# Patient Record
Sex: MALE | Race: OTHER | Employment: OTHER | URBAN - METROPOLITAN AREA
[De-identification: names, ages, dates, MRNs, and addresses within clinical notes are randomized per-mention and may not be internally consistent; named-entity substitution may affect disease eponyms.]

---

## 2019-12-05 ENCOUNTER — APPOINTMENT (OUTPATIENT)
Dept: GENERAL RADIOLOGY | Facility: HOSPITAL | Age: 74
End: 2019-12-05
Attending: EMERGENCY MEDICINE
Payer: MEDICARE

## 2019-12-05 ENCOUNTER — HOSPITAL ENCOUNTER (EMERGENCY)
Facility: HOSPITAL | Age: 74
Discharge: HOME | End: 2019-12-05
Attending: EMERGENCY MEDICINE
Payer: MEDICARE

## 2019-12-05 ENCOUNTER — APPOINTMENT (OUTPATIENT)
Dept: CT IMAGING | Facility: HOSPITAL | Age: 74
End: 2019-12-05
Attending: EMERGENCY MEDICINE
Payer: MEDICARE

## 2019-12-05 VITALS
RESPIRATION RATE: 18 BRPM | BODY MASS INDEX: 25.9 KG/M2 | HEIGHT: 67 IN | DIASTOLIC BLOOD PRESSURE: 52 MMHG | WEIGHT: 165 LBS | HEART RATE: 75 BPM | SYSTOLIC BLOOD PRESSURE: 125 MMHG | TEMPERATURE: 97 F | OXYGEN SATURATION: 98 %

## 2019-12-05 DIAGNOSIS — T14.8XXA SKIN ABRASION: ICD-10-CM

## 2019-12-05 DIAGNOSIS — S60.221A CONTUSION OF RIGHT HAND, INITIAL ENCOUNTER: ICD-10-CM

## 2019-12-05 DIAGNOSIS — S01.81XA FACIAL LACERATION, INITIAL ENCOUNTER: Primary | ICD-10-CM

## 2019-12-05 PROCEDURE — 73130 X-RAY EXAM OF HAND: CPT | Performed by: EMERGENCY MEDICINE

## 2019-12-05 PROCEDURE — 70450 CT HEAD/BRAIN W/O DYE: CPT | Performed by: EMERGENCY MEDICINE

## 2019-12-05 PROCEDURE — 99284 EMERGENCY DEPT VISIT MOD MDM: CPT

## 2019-12-05 PROCEDURE — 12013 RPR F/E/E/N/L/M 2.6-5.0 CM: CPT

## 2019-12-06 NOTE — ED NOTES
Patient provided discharge instructions. Instructions reviewed with patient. Patient verbalized understanding. All questions/concerns addressed.

## 2019-12-06 NOTE — ED PROVIDER NOTES
Patient Seen in: [facility] AND [facility] Emergency Department      History   Patient presents with:  Laceration Abrasion  Head Neck Injury    Stated Complaint: fall lac    HPI    Patient is 43-year-old male who presents with family for fall that occurred immed no distension  Extremities: FROM of all extremities, no cyanosis/clubbing/edema.  TTP over thenar eminence right hand with STS and ecchymosis  Neuro: CN intact, normal speech, normal gait, 5/5 motor strength in all extremities, no focal deficits  SKIN: warm

## 2019-12-11 ENCOUNTER — HOSPITAL ENCOUNTER (OUTPATIENT)
Age: 74
Discharge: HOME | End: 2019-12-11
Attending: EMERGENCY MEDICINE
Payer: MEDICARE

## 2019-12-11 VITALS
WEIGHT: 167 LBS | HEART RATE: 72 BPM | OXYGEN SATURATION: 100 % | DIASTOLIC BLOOD PRESSURE: 56 MMHG | SYSTOLIC BLOOD PRESSURE: 135 MMHG | BODY MASS INDEX: 26.21 KG/M2 | HEIGHT: 67 IN | TEMPERATURE: 97 F | RESPIRATION RATE: 16 BRPM

## 2019-12-11 DIAGNOSIS — Z48.02 ENCOUNTER FOR REMOVAL OF SUTURES: Primary | ICD-10-CM

## 2019-12-11 NOTE — ED PROVIDER NOTES
Patient Seen in: [address]      History   Patient presents with:  Suture Removal    Stated Complaint: remove stitches    HPI    Patient is a 78-year-old male who is here for an encounter for suture removal.  He had 7 st Ht 170.2 cm (5' 7\")   Wt 75.8 kg   SpO2 100%   BMI 26.16 kg/m²         Physical Exam  Vitals signs and nursing note reviewed. Constitutional:       Appearance: Normal appearance.    HENT:      Right Ear: Tympanic membrane normal.      Left Ear: Tympanic

## 2019-12-11 NOTE — ED INITIAL ASSESSMENT (HPI)
Pt here for suture removal  Pt had sutures placed to left forehead on 12/5/19 in [facility] after fall

## 2019-12-16 ENCOUNTER — HOSPITAL ENCOUNTER (OUTPATIENT)
Age: 74
Discharge: HOME | End: 2019-12-16
Attending: EMERGENCY MEDICINE
Payer: MEDICARE

## 2019-12-16 VITALS
TEMPERATURE: 98 F | BODY MASS INDEX: 26 KG/M2 | WEIGHT: 167 LBS | RESPIRATION RATE: 16 BRPM | OXYGEN SATURATION: 100 % | DIASTOLIC BLOOD PRESSURE: 56 MMHG | SYSTOLIC BLOOD PRESSURE: 127 MMHG | HEART RATE: 73 BPM

## 2019-12-16 DIAGNOSIS — Z48.02 ENCOUNTER FOR REMOVAL OF SUTURES: Primary | ICD-10-CM

## 2019-12-16 NOTE — ED PROVIDER NOTES
Patient Seen in: [address]      History   Patient presents with:  Laceration Abrasion    Stated Complaint: forehead problem    HPI  Patient sustained a forehead laceration which was repaired on December 5 after a fall. Left Ear: External ear normal.   Eyes:      Conjunctiva/sclera: Conjunctivae normal.   Neck:      Musculoskeletal: Normal range of motion and neck supple. Cardiovascular:      Rate and Rhythm: Normal rate and regular rhythm.       Heart sounds: Normal

## 2019-12-16 NOTE — ED INITIAL ASSESSMENT (HPI)
Pt presents for follow up on forehead laceration. Pt had sutures removed two days ago, but still has one suture in place.

## 2020-02-17 PROBLEM — E83.39 HYPOPHOSPHATEMIA: Status: ACTIVE | Noted: 2018-01-04

## 2020-02-17 PROBLEM — K20.90 ESOPHAGITIS: Status: ACTIVE | Noted: 2017-09-05

## 2020-02-17 PROBLEM — D84.9 IMMUNOCOMPROMISED PATIENT (HCC): Status: ACTIVE | Noted: 2020-02-17

## 2020-02-17 PROBLEM — E83.42 HYPOMAGNESEMIA: Status: ACTIVE | Noted: 2018-01-04

## 2020-02-17 PROBLEM — E78.5 DYSLIPIDEMIA: Status: ACTIVE | Noted: 2020-02-17

## 2020-02-17 PROBLEM — E55.9 VITAMIN D DEFICIENCY: Status: ACTIVE | Noted: 2018-01-04

## 2020-02-17 PROBLEM — N25.81 SECONDARY HYPERPARATHYROIDISM OF RENAL ORIGIN (HCC): Status: ACTIVE | Noted: 2020-02-17

## 2020-02-17 PROBLEM — D50.9 IRON DEFICIENCY ANEMIA: Status: ACTIVE | Noted: 2018-01-04

## 2020-02-17 PROBLEM — M85.859 OSTEOPENIA OF NECK OF FEMUR: Status: ACTIVE | Noted: 2019-11-12

## 2020-02-17 PROBLEM — N20.0 CALCULUS OF KIDNEY: Status: ACTIVE | Noted: 2020-02-17

## 2020-02-17 PROBLEM — Z94.0 KIDNEY REPLACED BY TRANSPLANT: Status: ACTIVE | Noted: 2020-02-17

## 2020-09-15 PROBLEM — J80 ACUTE RESPIRATORY DISTRESS SYNDROME (ARDS) DUE TO COVID-19 VIRUS (HCC): Status: ACTIVE | Noted: 2020-05-02

## 2020-09-15 PROBLEM — U07.1 ACUTE RESPIRATORY DISTRESS SYNDROME (ARDS) DUE TO COVID-19 VIRUS (HCC): Status: ACTIVE | Noted: 2020-05-02

## 2021-12-06 PROBLEM — N18.4 CHRONIC RENAL DISEASE, STAGE IV (HCC): Status: ACTIVE | Noted: 2021-12-06

## 2023-01-24 ENCOUNTER — HOSPITAL ENCOUNTER (INPATIENT)
Facility: HOSPITAL | Age: 78
LOS: 1 days | Discharge: HOME | End: 2023-01-25
Attending: EMERGENCY MEDICINE | Admitting: HOSPITALIST
Payer: MEDICARE

## 2023-01-24 DIAGNOSIS — E87.5 HYPERKALEMIA: ICD-10-CM

## 2023-01-24 DIAGNOSIS — A08.4 VIRAL GASTROENTERITIS: Primary | ICD-10-CM

## 2023-01-24 LAB
ALBUMIN SERPL-MCNC: 3.5 G/DL (ref 3.4–5)
ALP LIVER SERPL-CCNC: 63 U/L
ALT SERPL-CCNC: 29 U/L
ANION GAP SERPL CALC-SCNC: 6 MMOL/L (ref 0–18)
AST SERPL-CCNC: 11 U/L (ref 15–37)
BASOPHILS # BLD AUTO: 0.04 X10(3) UL (ref 0–0.2)
BASOPHILS NFR BLD AUTO: 0.4 %
BILIRUB DIRECT SERPL-MCNC: <0.1 MG/DL (ref 0–0.2)
BILIRUB SERPL-MCNC: 0.4 MG/DL (ref 0.1–2)
BUN BLD-MCNC: 76 MG/DL (ref 7–18)
BUN/CREAT SERPL: 26.7 (ref 10–20)
CALCIUM BLD-MCNC: 9.2 MG/DL (ref 8.5–10.1)
CHLORIDE SERPL-SCNC: 102 MMOL/L (ref 98–112)
CO2 SERPL-SCNC: 25 MMOL/L (ref 21–32)
CREAT BLD-MCNC: 2.85 MG/DL
DEPRECATED RDW RBC AUTO: 46.9 FL (ref 35.1–46.3)
EOSINOPHIL # BLD AUTO: 0.09 X10(3) UL (ref 0–0.7)
EOSINOPHIL NFR BLD AUTO: 0.8 %
ERYTHROCYTE [DISTWIDTH] IN BLOOD BY AUTOMATED COUNT: 14.6 % (ref 11–15)
EST. AVERAGE GLUCOSE BLD GHB EST-MCNC: 209 MG/DL (ref 68–126)
GFR SERPLBLD BASED ON 1.73 SQ M-ARVRAT: 22 ML/MIN/1.73M2 (ref 60–?)
GLUCOSE BLD-MCNC: 338 MG/DL (ref 70–99)
GLUCOSE BLDC GLUCOMTR-MCNC: 172 MG/DL (ref 70–99)
GLUCOSE BLDC GLUCOMTR-MCNC: 192 MG/DL (ref 70–99)
GLUCOSE BLDC GLUCOMTR-MCNC: 224 MG/DL (ref 70–99)
GLUCOSE BLDC GLUCOMTR-MCNC: 255 MG/DL (ref 70–99)
GLUCOSE BLDC GLUCOMTR-MCNC: 95 MG/DL (ref 70–99)
HBA1C MFR BLD: 8.9 % (ref ?–5.7)
HCT VFR BLD AUTO: 38.6 %
HGB BLD-MCNC: 12.5 G/DL
IMM GRANULOCYTES # BLD AUTO: 0.06 X10(3) UL (ref 0–1)
IMM GRANULOCYTES NFR BLD: 0.5 %
LYMPHOCYTES # BLD AUTO: 0.89 X10(3) UL (ref 1–4)
LYMPHOCYTES NFR BLD AUTO: 7.9 %
MCH RBC QN AUTO: 28.5 PG (ref 26–34)
MCHC RBC AUTO-ENTMCNC: 32.4 G/DL (ref 31–37)
MCV RBC AUTO: 87.9 FL
MONOCYTES # BLD AUTO: 0.69 X10(3) UL (ref 0.1–1)
MONOCYTES NFR BLD AUTO: 6.1 %
NEUTROPHILS # BLD AUTO: 9.52 X10 (3) UL (ref 1.5–7.7)
NEUTROPHILS # BLD AUTO: 9.52 X10(3) UL (ref 1.5–7.7)
NEUTROPHILS NFR BLD AUTO: 84.3 %
OSMOLALITY SERPL CALC.SUM OF ELEC: 312 MOSM/KG (ref 275–295)
PLATELET # BLD AUTO: 189 10(3)UL (ref 150–450)
POTASSIUM SERPL-SCNC: 6.2 MMOL/L (ref 3.5–5.1)
PROT SERPL-MCNC: 7.6 G/DL (ref 6.4–8.2)
RBC # BLD AUTO: 4.39 X10(6)UL
SARS-COV-2 RNA RESP QL NAA+PROBE: NOT DETECTED
SODIUM SERPL-SCNC: 133 MMOL/L (ref 136–145)
WBC # BLD AUTO: 11.3 X10(3) UL (ref 4–11)

## 2023-01-24 PROCEDURE — 99222 1ST HOSP IP/OBS MODERATE 55: CPT | Performed by: HOSPITALIST

## 2023-01-24 RX ORDER — TAMSULOSIN HYDROCHLORIDE 0.4 MG/1
0.4 CAPSULE ORAL 2 TIMES DAILY
Status: DISCONTINUED | OUTPATIENT
Start: 2023-01-24 | End: 2023-01-25

## 2023-01-24 RX ORDER — PANTOPRAZOLE SODIUM 40 MG/1
40 TABLET, DELAYED RELEASE ORAL
Status: DISCONTINUED | OUTPATIENT
Start: 2023-01-24 | End: 2023-01-25

## 2023-01-24 RX ORDER — DEXTROSE MONOHYDRATE 25 G/50ML
50 INJECTION, SOLUTION INTRAVENOUS
Status: DISCONTINUED | OUTPATIENT
Start: 2023-01-24 | End: 2023-01-25

## 2023-01-24 RX ORDER — MAGNESIUM HYDROXIDE/ALUMINUM HYDROXICE/SIMETHICONE 120; 1200; 1200 MG/30ML; MG/30ML; MG/30ML
30 SUSPENSION ORAL 4 TIMES DAILY PRN
Status: DISCONTINUED | OUTPATIENT
Start: 2023-01-24 | End: 2023-01-25

## 2023-01-24 RX ORDER — CALCITRIOL 0.25 UG/1
0.25 CAPSULE, LIQUID FILLED ORAL DAILY
Status: DISCONTINUED | OUTPATIENT
Start: 2023-01-24 | End: 2023-01-25

## 2023-01-24 RX ORDER — NIFEDIPINE 30 MG/1
30 TABLET, EXTENDED RELEASE ORAL DAILY
Status: DISCONTINUED | OUTPATIENT
Start: 2023-01-24 | End: 2023-01-25

## 2023-01-24 RX ORDER — PREDNISONE 1 MG/1
5 TABLET ORAL DAILY
Status: DISCONTINUED | OUTPATIENT
Start: 2023-01-24 | End: 2023-01-25

## 2023-01-24 RX ORDER — ACETAMINOPHEN 325 MG/1
650 TABLET ORAL EVERY 6 HOURS PRN
Status: DISCONTINUED | OUTPATIENT
Start: 2023-01-24 | End: 2023-01-25

## 2023-01-24 RX ORDER — LOPERAMIDE HYDROCHLORIDE 2 MG/1
2 TABLET ORAL AS NEEDED
Qty: 20 TABLET | Refills: 0 | Status: SHIPPED | OUTPATIENT
Start: 2023-01-24 | End: 2023-02-23

## 2023-01-24 RX ORDER — SODIUM CHLORIDE 9 MG/ML
INJECTION, SOLUTION INTRAVENOUS CONTINUOUS
Status: DISCONTINUED | OUTPATIENT
Start: 2023-01-24 | End: 2023-01-25

## 2023-01-24 RX ORDER — LOSARTAN POTASSIUM 50 MG/1
50 TABLET ORAL DAILY
COMMUNITY
End: 2023-01-25

## 2023-01-24 RX ORDER — ASPIRIN 81 MG/1
TABLET, CHEWABLE ORAL DAILY
COMMUNITY

## 2023-01-24 RX ORDER — ONDANSETRON 2 MG/ML
4 INJECTION INTRAMUSCULAR; INTRAVENOUS ONCE
Status: COMPLETED | OUTPATIENT
Start: 2023-01-24 | End: 2023-01-24

## 2023-01-24 RX ORDER — METOPROLOL TARTRATE 50 MG/1
50 TABLET, FILM COATED ORAL 2 TIMES DAILY
Status: DISCONTINUED | OUTPATIENT
Start: 2023-01-24 | End: 2023-01-25

## 2023-01-24 RX ORDER — HYDRALAZINE HYDROCHLORIDE 20 MG/ML
10 INJECTION INTRAMUSCULAR; INTRAVENOUS EVERY 4 HOURS PRN
Status: DISCONTINUED | OUTPATIENT
Start: 2023-01-24 | End: 2023-01-25

## 2023-01-24 RX ORDER — TACROLIMUS 0.5 MG/1
0.5 CAPSULE ORAL 2 TIMES DAILY
Status: DISCONTINUED | OUTPATIENT
Start: 2023-01-24 | End: 2023-01-25

## 2023-01-24 RX ORDER — FINASTERIDE 5 MG/1
5 TABLET, FILM COATED ORAL DAILY
Status: DISCONTINUED | OUTPATIENT
Start: 2023-01-24 | End: 2023-01-25

## 2023-01-24 RX ORDER — DOXEPIN HYDROCHLORIDE 50 MG/1
1 CAPSULE ORAL DAILY
Status: DISCONTINUED | OUTPATIENT
Start: 2023-01-24 | End: 2023-01-25

## 2023-01-24 RX ORDER — ONDANSETRON 2 MG/ML
4 INJECTION INTRAMUSCULAR; INTRAVENOUS EVERY 6 HOURS PRN
Status: DISCONTINUED | OUTPATIENT
Start: 2023-01-24 | End: 2023-01-25

## 2023-01-24 RX ORDER — ASPIRIN 81 MG/1
81 TABLET, CHEWABLE ORAL DAILY
Status: DISCONTINUED | OUTPATIENT
Start: 2023-01-24 | End: 2023-01-25

## 2023-01-24 RX ORDER — ROSUVASTATIN CALCIUM 5 MG/1
5 TABLET, COATED ORAL DAILY
Status: DISCONTINUED | OUTPATIENT
Start: 2023-01-24 | End: 2023-01-25

## 2023-01-24 RX ORDER — NICOTINE POLACRILEX 4 MG
30 LOZENGE BUCCAL
Status: DISCONTINUED | OUTPATIENT
Start: 2023-01-24 | End: 2023-01-25

## 2023-01-24 RX ORDER — ONDANSETRON 4 MG/1
4 TABLET, ORALLY DISINTEGRATING ORAL EVERY 4 HOURS PRN
Qty: 10 TABLET | Refills: 0 | Status: SHIPPED | OUTPATIENT
Start: 2023-01-24 | End: 2023-01-31

## 2023-01-24 RX ORDER — METOCLOPRAMIDE HYDROCHLORIDE 5 MG/ML
5 INJECTION INTRAMUSCULAR; INTRAVENOUS EVERY 6 HOURS PRN
Status: DISCONTINUED | OUTPATIENT
Start: 2023-01-24 | End: 2023-01-25

## 2023-01-24 RX ORDER — SODIUM POLYSTYRENE SULFONATE 4.1 MEQ/G
60 POWDER, FOR SUSPENSION ORAL; RECTAL ONCE
Status: COMPLETED | OUTPATIENT
Start: 2023-01-24 | End: 2023-01-24

## 2023-01-24 RX ORDER — NICOTINE POLACRILEX 4 MG
15 LOZENGE BUCCAL
Status: DISCONTINUED | OUTPATIENT
Start: 2023-01-24 | End: 2023-01-25

## 2023-01-24 RX ORDER — GABAPENTIN 100 MG/1
200 CAPSULE ORAL NIGHTLY
Status: DISCONTINUED | OUTPATIENT
Start: 2023-01-24 | End: 2023-01-25

## 2023-01-24 RX ORDER — HEPARIN SODIUM 5000 [USP'U]/ML
5000 INJECTION, SOLUTION INTRAVENOUS; SUBCUTANEOUS EVERY 12 HOURS SCHEDULED
Status: DISCONTINUED | OUTPATIENT
Start: 2023-01-24 | End: 2023-01-25

## 2023-01-24 RX ORDER — ZOLPIDEM TARTRATE 5 MG/1
5 TABLET ORAL NIGHTLY PRN
Status: DISCONTINUED | OUTPATIENT
Start: 2023-01-24 | End: 2023-01-25

## 2023-01-24 RX ORDER — MORPHINE SULFATE 4 MG/ML
4 INJECTION, SOLUTION INTRAMUSCULAR; INTRAVENOUS ONCE
Status: COMPLETED | OUTPATIENT
Start: 2023-01-24 | End: 2023-01-24

## 2023-01-24 NOTE — ED INITIAL ASSESSMENT (HPI)
Patient presents to ED with abd pain an nvd that started this morning. Patient has hx of double kidney transplant and DM.
within normal limits

## 2023-01-24 NOTE — PLAN OF CARE
Received patient from ED. Home medications reviewed with his daughter Je Carey). Order obtained. Up with x1 assist. Continent x2. Safety precaution maintained. Plan for monitor potassium level. Problem: Patient Centered Care  Goal: Patient preferences are identified and integrated in the patient's plan of care  Description: Interventions:  - What would you like us to know as we care for you?  Chinese speaking  - Provide timely, complete, and accurate information to patient/family  - Incorporate patient and family knowledge, values, beliefs, and cultural backgrounds into the planning and delivery of care  - Encourage patient/family to participate in care and decision-making at the level they choose  - Honor patient and family perspectives and choices  Outcome: Progressing     Problem: Diabetes/Glucose Control  Goal: Glucose maintained within prescribed range  Description: INTERVENTIONS:  - Monitor Blood Glucose as ordered  - Assess for signs and symptoms of hyperglycemia and hypoglycemia  - Administer ordered medications to maintain glucose within target range  - Assess barriers to adequate nutritional intake and initiate nutrition consult as needed  - Instruct patient on self management of diabetes  Outcome: Progressing     Problem: Patient/Family Goals  Goal: Patient/Family Long Term Goal  Description: Patient's Long Term Goal: Discharge safely    Interventions:  - Cardiac monitoring  - Vs monitoring  -Labs reviewed    - See additional Care Plan goals for specific interventions  Outcome: Progressing  Goal: Patient/Family Short Term Goal  Description: Patient's Short Term Goal: Hemodynamically stable    Interventions:   - Cardiac monitoring  - Lab reviewed  - Meds  - See additional Care Plan goals for specific interventions  Outcome: Progressing     Problem: CARDIOVASCULAR - ADULT  Goal: Maintains optimal cardiac output and hemodynamic stability  Description: INTERVENTIONS:  - Monitor vital signs, rhythm, and trends  - Monitor for bleeding, hypotension and signs of decreased cardiac output  - Evaluate effectiveness of vasoactive medications to optimize hemodynamic stability  - Monitor arterial and/or venous puncture sites for bleeding and/or hematoma  - Assess quality of pulses, skin color and temperature  - Assess for signs of decreased coronary artery perfusion - ex.  Angina  - Evaluate fluid balance, assess for edema, trend weights  Outcome: Progressing  Goal: Absence of cardiac arrhythmias or at baseline  Description: INTERVENTIONS:  - Continuous cardiac monitoring, monitor vital signs, obtain 12 lead EKG if indicated  - Evaluate effectiveness of antiarrhythmic and heart rate control medications as ordered  - Initiate emergency measures for life threatening arrhythmias  - Monitor electrolytes and administer replacement therapy as ordered  Outcome: Progressing     Problem: GASTROINTESTINAL - ADULT  Goal: Minimal or absence of nausea and vomiting  Description: INTERVENTIONS:  - Maintain adequate hydration with IV or PO as ordered and tolerated  - Nasogastric tube to low intermittent suction as ordered  - Evaluate effectiveness of ordered antiemetic medications  - Provide nonpharmacologic comfort measures as appropriate  - Advance diet as tolerated, if ordered  - Obtain nutritional consult as needed  - Evaluate fluid balance  Outcome: Progressing     Problem: METABOLIC/FLUID AND ELECTROLYTES - ADULT  Goal: Glucose maintained within prescribed range  Description: INTERVENTIONS:  - Monitor Blood Glucose as ordered  - Assess for signs and symptoms of hyperglycemia and hypoglycemia  - Administer ordered medications to maintain glucose within target range  - Assess barriers to adequate nutritional intake and initiate nutrition consult as needed  - Instruct patient on self management of diabetes  Outcome: Progressing  Goal: Electrolytes maintained within normal limits  Description: INTERVENTIONS:  - Monitor labs and rhythm and assess patient for signs and symptoms of electrolyte imbalances  - Administer electrolyte replacement as ordered  - Monitor response to electrolyte replacements, including rhythm and repeat lab results as appropriate  - Fluid restriction as ordered  - Instruct patient on fluid and nutrition restrictions as appropriate  Outcome: Progressing  Goal: Hemodynamic stability and optimal renal function maintained  Description: INTERVENTIONS:  - Monitor labs and assess for signs and symptoms of volume excess or deficit  - Monitor intake, output and patient weight  - Monitor urine specific gravity, serum osmolarity and serum sodium as indicated or ordered  - Monitor response to interventions for patient's volume status, including labs, urine output, blood pressure (other measures as available)  - Encourage oral intake as appropriate  - Instruct patient on fluid and nutrition restrictions as appropriate  Outcome: Progressing

## 2023-01-24 NOTE — ED PROVIDER NOTES
Prior to patient being discharged, potassium come back elevated. EKG sinus rhythm with rate 67 with normal-appearing intervals. Bicarb/kayexalate/fluids given.  D/w patient admission, d/w dr Jody Menon.

## 2023-01-24 NOTE — ED QUICK NOTES
Orders for admission, patient is aware of plan and ready to go upstairs. Any questions, please call ED RN Dee Betancourt RN at extension [phone].      Patient Covid vaccination status: Fully vaccinated     COVID Test Ordered in ED: Rapid SARS-CoV-2 by PCR    COVID Suspicion at Admission: N/A    Running Infusions:  None    Mental Status/LOC at time of transport: AAOx4    Other pertinent information: Fall risk  CIWA score: N/A   NIH score:  N/A

## 2023-01-25 VITALS
TEMPERATURE: 99 F | WEIGHT: 157.5 LBS | BODY MASS INDEX: 25 KG/M2 | RESPIRATION RATE: 16 BRPM | OXYGEN SATURATION: 96 % | SYSTOLIC BLOOD PRESSURE: 137 MMHG | DIASTOLIC BLOOD PRESSURE: 55 MMHG | HEART RATE: 81 BPM

## 2023-01-25 LAB
ANION GAP SERPL CALC-SCNC: 8 MMOL/L (ref 0–18)
ATRIAL RATE: 67 BPM
BUN BLD-MCNC: 61 MG/DL (ref 7–18)
BUN/CREAT SERPL: 34.1 (ref 10–20)
CALCIUM BLD-MCNC: 8.5 MG/DL (ref 8.5–10.1)
CHLORIDE SERPL-SCNC: 114 MMOL/L (ref 98–112)
CO2 SERPL-SCNC: 25 MMOL/L (ref 21–32)
CREAT BLD-MCNC: 1.79 MG/DL
GFR SERPLBLD BASED ON 1.73 SQ M-ARVRAT: 39 ML/MIN/1.73M2 (ref 60–?)
GLUCOSE BLD-MCNC: 102 MG/DL (ref 70–99)
GLUCOSE BLDC GLUCOMTR-MCNC: 150 MG/DL (ref 70–99)
GLUCOSE BLDC GLUCOMTR-MCNC: 74 MG/DL (ref 70–99)
GLUCOSE BLDC GLUCOMTR-MCNC: 86 MG/DL (ref 70–99)
OSMOLALITY SERPL CALC.SUM OF ELEC: 321 MOSM/KG (ref 275–295)
P AXIS: 35 DEGREES
P-R INTERVAL: 214 MS
POTASSIUM SERPL-SCNC: 4.1 MMOL/L (ref 3.5–5.1)
Q-T INTERVAL: 424 MS
QRS DURATION: 90 MS
QTC CALCULATION (BEZET): 448 MS
R AXIS: 7 DEGREES
SODIUM SERPL-SCNC: 147 MMOL/L (ref 136–145)
T AXIS: 106 DEGREES
VENTRICULAR RATE: 67 BPM

## 2023-01-25 PROCEDURE — 99239 HOSP IP/OBS DSCHRG MGMT >30: CPT | Performed by: HOSPITALIST

## 2023-01-25 NOTE — PLAN OF CARE
Vitals stable, no complaints overnight. Pending morning labs possible discharge. Fall precautions in place. Problem: Patient Centered Care  Goal: Patient preferences are identified and integrated in the patient's plan of care  Description: Interventions:  - What would you like us to know as we care for you?  Bengali speaking  - Provide timely, complete, and accurate information to patient/family  - Incorporate patient and family knowledge, values, beliefs, and cultural backgrounds into the planning and delivery of care  - Encourage patient/family to participate in care and decision-making at the level they choose  - Honor patient and family perspectives and choices  Outcome: Progressing     Problem: Diabetes/Glucose Control  Goal: Glucose maintained within prescribed range  Description: INTERVENTIONS:  - Monitor Blood Glucose as ordered  - Assess for signs and symptoms of hyperglycemia and hypoglycemia  - Administer ordered medications to maintain glucose within target range  - Assess barriers to adequate nutritional intake and initiate nutrition consult as needed  - Instruct patient on self management of diabetes  Outcome: Progressing     Problem: Patient/Family Goals  Goal: Patient/Family Long Term Goal  Description: Patient's Long Term Goal: Discharge safely    Interventions:  - Cardiac monitoring  - Vs monitoring  -Labs reviewed    - See additional Care Plan goals for specific interventions  Outcome: Progressing  Goal: Patient/Family Short Term Goal  Description: Patient's Short Term Goal: Hemodynamically stable    Interventions:   - Cardiac monitoring  - Lab reviewed  - Meds  - See additional Care Plan goals for specific interventions  Outcome: Progressing     Problem: CARDIOVASCULAR - ADULT  Goal: Maintains optimal cardiac output and hemodynamic stability  Description: INTERVENTIONS:  - Monitor vital signs, rhythm, and trends  - Monitor for bleeding, hypotension and signs of decreased cardiac output  - Evaluate effectiveness of vasoactive medications to optimize hemodynamic stability  - Monitor arterial and/or venous puncture sites for bleeding and/or hematoma  - Assess quality of pulses, skin color and temperature  - Assess for signs of decreased coronary artery perfusion - ex.  Angina  - Evaluate fluid balance, assess for edema, trend weights  Outcome: Progressing  Goal: Absence of cardiac arrhythmias or at baseline  Description: INTERVENTIONS:  - Continuous cardiac monitoring, monitor vital signs, obtain 12 lead EKG if indicated  - Evaluate effectiveness of antiarrhythmic and heart rate control medications as ordered  - Initiate emergency measures for life threatening arrhythmias  - Monitor electrolytes and administer replacement therapy as ordered  Outcome: Progressing     Problem: GASTROINTESTINAL - ADULT  Goal: Minimal or absence of nausea and vomiting  Description: INTERVENTIONS:  - Maintain adequate hydration with IV or PO as ordered and tolerated  - Nasogastric tube to low intermittent suction as ordered  - Evaluate effectiveness of ordered antiemetic medications  - Provide nonpharmacologic comfort measures as appropriate  - Advance diet as tolerated, if ordered  - Obtain nutritional consult as needed  - Evaluate fluid balance  Outcome: Progressing     Problem: METABOLIC/FLUID AND ELECTROLYTES - ADULT  Goal: Glucose maintained within prescribed range  Description: INTERVENTIONS:  - Monitor Blood Glucose as ordered  - Assess for signs and symptoms of hyperglycemia and hypoglycemia  - Administer ordered medications to maintain glucose within target range  - Assess barriers to adequate nutritional intake and initiate nutrition consult as needed  - Instruct patient on self management of diabetes  Outcome: Progressing  Goal: Electrolytes maintained within normal limits  Description: INTERVENTIONS:  - Monitor labs and rhythm and assess patient for signs and symptoms of electrolyte imbalances  - Administer electrolyte replacement as ordered  - Monitor response to electrolyte replacements, including rhythm and repeat lab results as appropriate  - Fluid restriction as ordered  - Instruct patient on fluid and nutrition restrictions as appropriate  Outcome: Progressing  Goal: Hemodynamic stability and optimal renal function maintained  Description: INTERVENTIONS:  - Monitor labs and assess for signs and symptoms of volume excess or deficit  - Monitor intake, output and patient weight  - Monitor urine specific gravity, serum osmolarity and serum sodium as indicated or ordered  - Monitor response to interventions for patient's volume status, including labs, urine output, blood pressure (other measures as available)  - Encourage oral intake as appropriate  - Instruct patient on fluid and nutrition restrictions as appropriate  Outcome: Progressing

## 2023-01-26 ENCOUNTER — PATIENT OUTREACH (OUTPATIENT)
Dept: CASE MANAGEMENT | Age: 78
End: 2023-01-26

## 2023-01-26 NOTE — PROGRESS NOTES
1st attempt DM apt request & DM f/u questions (dc 01/25)    Dr Guerra [facility]  [address], [address] [place] [address]   [phone]    Apt made: Mon 01/30 @3:00pm w/SHADE Bahena  [address] [facility],[address] [facility], [facility]  Confirmed w/pt daughter, Kerwin Chen  Pt daughter, Kerwin Chen answered all questions for pt  Closing encounter    Diabetic Outreach D/C Follow Up Questions:     1. Did you receive the information provided during your hospitalization and at discharge about diabetes? yes    If No:  Would you like us to mail you the information? no   If Yes:  Was the information helpful? Yes              2. Do you have all of your diabetes medications now that you are home? Yes  If yes:  do you understand how to take your medications. yes    If No: Are there barriers to getting your medications? no       3. Did you make the necessary transportation arrangements to get to your diabetes follow-up appointment? Yes         If pt answers No to questions #2 and #3 Advise pt you will have a clinical person contact them to address.

## 2023-09-21 ENCOUNTER — HOSPITAL ENCOUNTER (OUTPATIENT)
Age: 78
Discharge: HOME | End: 2023-09-21
Payer: MEDICARE

## 2023-09-21 VITALS
SYSTOLIC BLOOD PRESSURE: 114 MMHG | RESPIRATION RATE: 18 BRPM | OXYGEN SATURATION: 100 % | TEMPERATURE: 98 F | HEART RATE: 66 BPM | DIASTOLIC BLOOD PRESSURE: 39 MMHG

## 2023-09-21 DIAGNOSIS — S51.819A SKIN TEAR OF FOREARM WITHOUT COMPLICATION, INITIAL ENCOUNTER: Primary | ICD-10-CM
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
## 2023-09-21 PROCEDURE — 99203 OFFICE O/P NEW LOW 30 MIN: CPT

## 2023-09-21 NOTE — ED INITIAL ASSESSMENT (HPI)
Patient brought in to [address] [place] [address] by daughter who states patient was outside and missed a step. Granddaughter broke the fall, grabbed on to patient's right wrist, causing two skin tears. Patient denies using blood thinners. Denies hitting head. Denies chest pain, dizziness, or weakness. Does not remember last tdap. Bleeding controlled .

## 2023-09-21 NOTE — DISCHARGE INSTRUCTIONS
As discussed, given the uncertainty of the fall I would recommend that you go to the emergency department. We did clean and dress the wound today. Please leave the Steri-Strips in place until they fall off or you follow-up with the wound clinic. Please change the nonadherent dressing daily, use a small amount of soap and water, pat dry and put a little bit of antibiotic ointment on the area. Watch for signs of infection including redness, swelling, drainage and if any of those things occur you should go to the emergency department. Otherwise please make an appointment to follow-up with your primary care doctor.

## 2023-12-18 ENCOUNTER — HOSPITAL ENCOUNTER (INPATIENT)
Facility: HOSPITAL | Age: 78
LOS: 4 days | Discharge: ANOTHER HOSPITAL | End: 2023-12-23
Attending: EMERGENCY MEDICINE | Admitting: STUDENT IN AN ORGANIZED HEALTH CARE EDUCATION/TRAINING PROGRAM
Payer: MEDICARE

## 2023-12-18 ENCOUNTER — APPOINTMENT (OUTPATIENT)
Dept: GENERAL RADIOLOGY | Facility: HOSPITAL | Age: 78
DRG: 871 | End: 2023-12-18
Attending: EMERGENCY MEDICINE
Payer: MEDICARE

## 2023-12-18 ENCOUNTER — APPOINTMENT (OUTPATIENT)
Dept: ULTRASOUND IMAGING | Facility: HOSPITAL | Age: 78
DRG: 871 | End: 2023-12-18
Attending: EMERGENCY MEDICINE
Payer: MEDICARE

## 2023-12-18 ENCOUNTER — APPOINTMENT (OUTPATIENT)
Dept: ULTRASOUND IMAGING | Facility: HOSPITAL | Age: 78
End: 2023-12-18
Attending: EMERGENCY MEDICINE
Payer: MEDICARE

## 2023-12-18 ENCOUNTER — HOSPITAL ENCOUNTER (INPATIENT)
Facility: HOSPITAL | Age: 78
LOS: 4 days | Discharge: ANOTHER HOSPITAL | DRG: 871 | End: 2023-12-23
Attending: EMERGENCY MEDICINE | Admitting: STUDENT IN AN ORGANIZED HEALTH CARE EDUCATION/TRAINING PROGRAM
Payer: MEDICARE

## 2023-12-18 ENCOUNTER — APPOINTMENT (OUTPATIENT)
Dept: GENERAL RADIOLOGY | Facility: HOSPITAL | Age: 78
End: 2023-12-18
Attending: EMERGENCY MEDICINE
Payer: MEDICARE

## 2023-12-18 DIAGNOSIS — N17.9 AKI (ACUTE KIDNEY INJURY) (HCC): ICD-10-CM

## 2023-12-18 DIAGNOSIS — R41.82 ALTERED MENTAL STATUS, UNSPECIFIED ALTERED MENTAL STATUS TYPE: ICD-10-CM

## 2023-12-18 DIAGNOSIS — A41.9 SEPSIS DUE TO UNDETERMINED ORGANISM (HCC): Primary | ICD-10-CM

## 2023-12-18 PROBLEM — J96.91 RESPIRATORY FAILURE WITH HYPOXIA (HCC): Status: ACTIVE | Noted: 2023-12-18

## 2023-12-18 LAB
ANION GAP SERPL CALC-SCNC: 7 MMOL/L (ref 0–18)
BASOPHILS # BLD AUTO: 0.04 X10(3) UL (ref 0–0.2)
BASOPHILS NFR BLD AUTO: 0.4 %
BILIRUB UR QL: NEGATIVE
BUN BLD-MCNC: 67 MG/DL (ref 9–23)
BUN/CREAT SERPL: 18.2 (ref 10–20)
CALCIUM BLD-MCNC: 9.2 MG/DL (ref 8.7–10.4)
CHLORIDE SERPL-SCNC: 111 MMOL/L (ref 98–112)
CHOLEST SERPL-MCNC: 150 MG/DL (ref ?–200)
CO2 SERPL-SCNC: 23 MMOL/L (ref 21–32)
CREAT BLD-MCNC: 3.69 MG/DL
DEPRECATED RDW RBC AUTO: 51.1 FL (ref 35.1–46.3)
EGFRCR SERPLBLD CKD-EPI 2021: 16 ML/MIN/1.73M2 (ref 60–?)
EOSINOPHIL # BLD AUTO: 0.07 X10(3) UL (ref 0–0.7)
EOSINOPHIL NFR BLD AUTO: 0.6 %
ERYTHROCYTE [DISTWIDTH] IN BLOOD BY AUTOMATED COUNT: 15.8 % (ref 11–15)
FLUAV + FLUBV RNA SPEC NAA+PROBE: NEGATIVE
FLUAV + FLUBV RNA SPEC NAA+PROBE: NEGATIVE
GLUCOSE BLD-MCNC: 168 MG/DL (ref 70–99)
GLUCOSE BLDC GLUCOMTR-MCNC: 160 MG/DL (ref 70–99)
GLUCOSE UR-MCNC: NORMAL MG/DL
HCT VFR BLD AUTO: 29.7 %
HDLC SERPL-MCNC: 36 MG/DL (ref 40–59)
HGB BLD-MCNC: 9.4 G/DL
IMM GRANULOCYTES # BLD AUTO: 0.05 X10(3) UL (ref 0–1)
IMM GRANULOCYTES NFR BLD: 0.5 %
KETONES UR-MCNC: NEGATIVE MG/DL
LACTATE SERPL-SCNC: 0.6 MMOL/L (ref 0.5–2)
LDLC SERPL CALC-MCNC: 92 MG/DL (ref ?–100)
LEUKOCYTE ESTERASE UR QL STRIP.AUTO: 500
LYMPHOCYTES # BLD AUTO: 1.25 X10(3) UL (ref 1–4)
LYMPHOCYTES NFR BLD AUTO: 11.3 %
MCH RBC QN AUTO: 28.1 PG (ref 26–34)
MCHC RBC AUTO-ENTMCNC: 31.6 G/DL (ref 31–37)
MCV RBC AUTO: 88.7 FL
MONOCYTES # BLD AUTO: 1 X10(3) UL (ref 0.1–1)
MONOCYTES NFR BLD AUTO: 9.1 %
NEUTROPHILS # BLD AUTO: 8.62 X10 (3) UL (ref 1.5–7.7)
NEUTROPHILS # BLD AUTO: 8.62 X10(3) UL (ref 1.5–7.7)
NEUTROPHILS NFR BLD AUTO: 78.1 %
NITRITE UR QL STRIP.AUTO: NEGATIVE
NONHDLC SERPL-MCNC: 114 MG/DL (ref ?–130)
OSMOLALITY SERPL CALC.SUM OF ELEC: 315 MOSM/KG (ref 275–295)
PH UR: 5 [PH] (ref 5–8)
PLATELET # BLD AUTO: 179 10(3)UL (ref 150–450)
POTASSIUM SERPL-SCNC: 5.3 MMOL/L (ref 3.5–5.1)
PROT UR-MCNC: 30 MG/DL
RBC # BLD AUTO: 3.35 X10(6)UL
RSV RNA SPEC NAA+PROBE: NEGATIVE
SARS-COV-2 RNA RESP QL NAA+PROBE: NOT DETECTED
SODIUM SERPL-SCNC: 141 MMOL/L (ref 136–145)
SP GR UR STRIP: 1.01 (ref 1–1.03)
TRIGL SERPL-MCNC: 124 MG/DL (ref 30–149)
TROPONIN I SERPL HS-MCNC: 100 NG/L
UROBILINOGEN UR STRIP-ACNC: NORMAL
VLDLC SERPL CALC-MCNC: 20 MG/DL (ref 0–30)
WBC # BLD AUTO: 11 X10(3) UL (ref 4–11)
WBC #/AREA URNS AUTO: >50 /HPF
WBC CLUMPS UR QL AUTO: PRESENT /HPF

## 2023-12-18 PROCEDURE — 71045 X-RAY EXAM CHEST 1 VIEW: CPT | Performed by: EMERGENCY MEDICINE

## 2023-12-18 PROCEDURE — 76776 US EXAM K TRANSPL W/DOPPLER: CPT | Performed by: EMERGENCY MEDICINE

## 2023-12-18 PROCEDURE — 99223 1ST HOSP IP/OBS HIGH 75: CPT | Performed by: HOSPITALIST

## 2023-12-18 RX ORDER — ACETAMINOPHEN 650 MG/1
650 SUPPOSITORY RECTAL ONCE
Status: COMPLETED | OUTPATIENT
Start: 2023-12-18 | End: 2023-12-18

## 2023-12-19 ENCOUNTER — APPOINTMENT (OUTPATIENT)
Dept: CT IMAGING | Facility: HOSPITAL | Age: 78
DRG: 871 | End: 2023-12-19
Attending: EMERGENCY MEDICINE
Payer: MEDICARE

## 2023-12-19 ENCOUNTER — APPOINTMENT (OUTPATIENT)
Dept: CT IMAGING | Facility: HOSPITAL | Age: 78
End: 2023-12-19
Attending: EMERGENCY MEDICINE
Payer: MEDICARE

## 2023-12-19 PROBLEM — R41.82 ALTERED MENTAL STATUS, UNSPECIFIED ALTERED MENTAL STATUS TYPE: Status: ACTIVE | Noted: 2023-12-19

## 2023-12-19 PROBLEM — A41.9 SEPSIS DUE TO UNDETERMINED ORGANISM (HCC): Status: ACTIVE | Noted: 2023-12-19

## 2023-12-19 PROBLEM — N17.9 AKI (ACUTE KIDNEY INJURY) (HCC): Status: ACTIVE | Noted: 2023-12-19

## 2023-12-19 PROBLEM — N25.81 SECONDARY HYPERPARATHYROIDISM (HCC): Status: ACTIVE | Noted: 2023-12-19

## 2023-12-19 PROBLEM — N18.4 CHRONIC KIDNEY DISEASE (CKD), STAGE IV (SEVERE) (HCC): Status: ACTIVE | Noted: 2023-12-19

## 2023-12-19 LAB
ANION GAP SERPL CALC-SCNC: 6 MMOL/L (ref 0–18)
ATRIAL RATE: 98 BPM
BASE EXCESS BLD CALC-SCNC: 0.7 MMOL/L (ref ?–2)
BASOPHILS # BLD AUTO: 0.05 X10(3) UL (ref 0–0.2)
BASOPHILS NFR BLD AUTO: 0.4 %
BNP SERPL-MCNC: 1409 PG/ML
BUN BLD-MCNC: 53 MG/DL (ref 9–23)
BUN/CREAT SERPL: 15.1 (ref 10–20)
CALCIUM BLD-MCNC: 9 MG/DL (ref 8.7–10.4)
CHLORIDE SERPL-SCNC: 115 MMOL/L (ref 98–112)
CO2 SERPL-SCNC: 19 MMOL/L (ref 21–32)
CREAT BLD-MCNC: 3.51 MG/DL
CREAT UR-SCNC: 85.3 MG/DL
DEPRECATED RDW RBC AUTO: 51.7 FL (ref 35.1–46.3)
EGFRCR SERPLBLD CKD-EPI 2021: 17 ML/MIN/1.73M2 (ref 60–?)
EOSINOPHIL # BLD AUTO: 0.03 X10(3) UL (ref 0–0.7)
EOSINOPHIL NFR BLD AUTO: 0.2 %
ERYTHROCYTE [DISTWIDTH] IN BLOOD BY AUTOMATED COUNT: 15.6 % (ref 11–15)
EST. AVERAGE GLUCOSE BLD GHB EST-MCNC: 174 MG/DL (ref 68–126)
GLUCOSE BLD-MCNC: 119 MG/DL (ref 70–99)
GLUCOSE BLDC GLUCOMTR-MCNC: 121 MG/DL (ref 70–99)
GLUCOSE BLDC GLUCOMTR-MCNC: 124 MG/DL (ref 70–99)
GLUCOSE BLDC GLUCOMTR-MCNC: 185 MG/DL (ref 70–99)
GLUCOSE BLDC GLUCOMTR-MCNC: 198 MG/DL (ref 70–99)
GLUCOSE BLDC GLUCOMTR-MCNC: 202 MG/DL (ref 70–99)
HBA1C MFR BLD: 7.7 % (ref ?–5.7)
HCO3 BLDA-SCNC: 25.4 MEQ/L (ref 21–27)
HCT VFR BLD AUTO: 29.3 %
HGB BLD-MCNC: 9.1 G/DL
IMM GRANULOCYTES # BLD AUTO: 0.05 X10(3) UL (ref 0–1)
IMM GRANULOCYTES NFR BLD: 0.4 %
L PNEUMO AG UR QL: NEGATIVE
LYMPHOCYTES # BLD AUTO: 0.91 X10(3) UL (ref 1–4)
LYMPHOCYTES NFR BLD AUTO: 7.3 %
MAGNESIUM SERPL-MCNC: 2.5 MG/DL (ref 1.6–2.6)
MCH RBC QN AUTO: 28.4 PG (ref 26–34)
MCHC RBC AUTO-ENTMCNC: 31.1 G/DL (ref 31–37)
MCV RBC AUTO: 91.6 FL
MODIFIED ALLEN TEST: POSITIVE
MONOCYTES # BLD AUTO: 0.94 X10(3) UL (ref 0.1–1)
MONOCYTES NFR BLD AUTO: 7.5 %
NEUTROPHILS # BLD AUTO: 10.57 X10 (3) UL (ref 1.5–7.7)
NEUTROPHILS # BLD AUTO: 10.57 X10(3) UL (ref 1.5–7.7)
NEUTROPHILS NFR BLD AUTO: 84.2 %
O2 CT BLD-SCNC: 12.3 VOL% (ref 15–23)
OSMOLALITY SERPL CALC.SUM OF ELEC: 306 MOSM/KG (ref 275–295)
OXYGEN LITERS/MINUTE: 6 L/MIN
P AXIS: 41 DEGREES
P-R INTERVAL: 206 MS
PCO2 BLDA: 40 MM HG (ref 35–45)
PH BLDA: 7.41 [PH] (ref 7.35–7.45)
PHOSPHATE SERPL-MCNC: 4.4 MG/DL (ref 2.4–5.1)
PLATELET # BLD AUTO: 150 10(3)UL (ref 150–450)
PO2 BLDA: 66 MM HG (ref 80–100)
POTASSIUM SERPL-SCNC: 5.3 MMOL/L (ref 3.5–5.1)
PROCALCITONIN SERPL-MCNC: 0.31 NG/ML (ref ?–0.05)
PUNCTURE CHARGE: YES
Q-T INTERVAL: 354 MS
QRS DURATION: 84 MS
QTC CALCULATION (BEZET): 451 MS
R AXIS: 31 DEGREES
RBC # BLD AUTO: 3.2 X10(6)UL
SAO2 % BLDA: 92.9 % (ref 94–100)
SODIUM SERPL-SCNC: 140 MMOL/L (ref 136–145)
SODIUM SERPL-SCNC: 37 MMOL/L
STREP PNEUMO ANTIGEN, URINE: NEGATIVE
T AXIS: 101 DEGREES
TACROLIMUS STAT: 1.3 MCG/L
VENTRICULAR RATE: 98 BPM
WBC # BLD AUTO: 12.6 X10(3) UL (ref 4–11)

## 2023-12-19 PROCEDURE — 99291 CRITICAL CARE FIRST HOUR: CPT | Performed by: INTERNAL MEDICINE

## 2023-12-19 PROCEDURE — 99233 SBSQ HOSP IP/OBS HIGH 50: CPT | Performed by: HOSPITALIST

## 2023-12-19 PROCEDURE — 74176 CT ABD & PELVIS W/O CONTRAST: CPT | Performed by: EMERGENCY MEDICINE

## 2023-12-19 PROCEDURE — 99223 1ST HOSP IP/OBS HIGH 75: CPT | Performed by: INTERNAL MEDICINE

## 2023-12-19 RX ORDER — CALCITRIOL 0.25 UG/1
0.25 CAPSULE, LIQUID FILLED ORAL DAILY
Status: DISCONTINUED | OUTPATIENT
Start: 2023-12-19 | End: 2023-12-23

## 2023-12-19 RX ORDER — DEXTROSE MONOHYDRATE 25 G/50ML
50 INJECTION, SOLUTION INTRAVENOUS
Status: DISCONTINUED | OUTPATIENT
Start: 2023-12-19 | End: 2023-12-23

## 2023-12-19 RX ORDER — HYDRALAZINE HYDROCHLORIDE 20 MG/ML
10 INJECTION INTRAMUSCULAR; INTRAVENOUS EVERY 4 HOURS PRN
Status: DISCONTINUED | OUTPATIENT
Start: 2023-12-19 | End: 2023-12-19

## 2023-12-19 RX ORDER — PANTOPRAZOLE SODIUM 40 MG/1
40 TABLET, DELAYED RELEASE ORAL
Status: DISCONTINUED | OUTPATIENT
Start: 2023-12-19 | End: 2023-12-23

## 2023-12-19 RX ORDER — TACROLIMUS 1 MG/1
1 CAPSULE ORAL EVERY 24 HOURS
COMMUNITY

## 2023-12-19 RX ORDER — NICOTINE POLACRILEX 4 MG
15 LOZENGE BUCCAL
Status: DISCONTINUED | OUTPATIENT
Start: 2023-12-19 | End: 2023-12-23

## 2023-12-19 RX ORDER — HYDRALAZINE HYDROCHLORIDE 20 MG/ML
10 INJECTION INTRAMUSCULAR; INTRAVENOUS EVERY 4 HOURS PRN
Status: DISCONTINUED | OUTPATIENT
Start: 2023-12-19 | End: 2023-12-23

## 2023-12-19 RX ORDER — FUROSEMIDE 10 MG/ML
20 INJECTION INTRAMUSCULAR; INTRAVENOUS ONCE
Status: COMPLETED | OUTPATIENT
Start: 2023-12-19 | End: 2023-12-19

## 2023-12-19 RX ORDER — SODIUM CHLORIDE 9 MG/ML
INJECTION, SOLUTION INTRAVENOUS CONTINUOUS
Status: DISCONTINUED | OUTPATIENT
Start: 2023-12-19 | End: 2023-12-19

## 2023-12-19 RX ORDER — SODIUM CHLORIDE 450 MG/100ML
INJECTION, SOLUTION INTRAVENOUS CONTINUOUS
Status: DISCONTINUED | OUTPATIENT
Start: 2023-12-19 | End: 2023-12-19

## 2023-12-19 RX ORDER — NICOTINE POLACRILEX 4 MG
30 LOZENGE BUCCAL
Status: DISCONTINUED | OUTPATIENT
Start: 2023-12-19 | End: 2023-12-23

## 2023-12-19 RX ORDER — TAMSULOSIN HYDROCHLORIDE 0.4 MG/1
0.4 CAPSULE ORAL 2 TIMES DAILY
Status: DISCONTINUED | OUTPATIENT
Start: 2023-12-19 | End: 2023-12-23

## 2023-12-19 RX ORDER — HEPARIN SODIUM 5000 [USP'U]/ML
5000 INJECTION, SOLUTION INTRAVENOUS; SUBCUTANEOUS EVERY 12 HOURS
Status: DISCONTINUED | OUTPATIENT
Start: 2023-12-19 | End: 2023-12-23

## 2023-12-19 RX ORDER — ONDANSETRON 2 MG/ML
4 INJECTION INTRAMUSCULAR; INTRAVENOUS EVERY 6 HOURS PRN
Status: DISCONTINUED | OUTPATIENT
Start: 2023-12-19 | End: 2023-12-23

## 2023-12-19 RX ORDER — DOCUSATE SODIUM 100 MG/1
100 CAPSULE, LIQUID FILLED ORAL 2 TIMES DAILY
Status: DISCONTINUED | OUTPATIENT
Start: 2023-12-19 | End: 2023-12-23

## 2023-12-19 RX ORDER — ACETAMINOPHEN 325 MG/1
650 TABLET ORAL EVERY 6 HOURS PRN
Status: DISCONTINUED | OUTPATIENT
Start: 2023-12-19 | End: 2023-12-23

## 2023-12-19 RX ORDER — PREDNISONE 5 MG/1
5 TABLET ORAL DAILY
Status: DISCONTINUED | OUTPATIENT
Start: 2023-12-19 | End: 2023-12-23

## 2023-12-19 RX ORDER — METOPROLOL TARTRATE 50 MG/1
50 TABLET, FILM COATED ORAL 2 TIMES DAILY
Status: DISCONTINUED | OUTPATIENT
Start: 2023-12-19 | End: 2023-12-23

## 2023-12-19 RX ORDER — TACROLIMUS 0.5 MG/1
0.5 CAPSULE ORAL 2 TIMES DAILY
Status: DISCONTINUED | OUTPATIENT
Start: 2023-12-19 | End: 2023-12-20

## 2023-12-19 RX ORDER — ASPIRIN 81 MG/1
81 TABLET, CHEWABLE ORAL DAILY
Status: DISCONTINUED | OUTPATIENT
Start: 2023-12-19 | End: 2023-12-23

## 2023-12-19 RX ORDER — ROSUVASTATIN CALCIUM 5 MG/1
5 TABLET, COATED ORAL
Status: DISCONTINUED | OUTPATIENT
Start: 2023-12-19 | End: 2023-12-23

## 2023-12-19 RX ORDER — NIFEDIPINE 30 MG/1
30 TABLET, EXTENDED RELEASE ORAL DAILY
Status: DISCONTINUED | OUTPATIENT
Start: 2023-12-19 | End: 2023-12-23

## 2023-12-19 RX ORDER — FINASTERIDE 5 MG/1
5 TABLET, FILM COATED ORAL DAILY
Status: DISCONTINUED | OUTPATIENT
Start: 2023-12-19 | End: 2023-12-23

## 2023-12-19 NOTE — CM/SW NOTE
12/19/23 1400   CM/SW Referral Data   Referral Source    Reason for Referral Discharge planning   Informant Patient;Daughter   Medical Hx   Does patient have an established PCP? Yes  Jesse Wynn)   Patient Info   Patient's Current Mental Status at Time of Assessment Alert;Oriented   Patient's [address]   Patient lives with Spouse/Significant other   Patient Status Prior to Admission   Independent with ADLs and Mobility Yes   [facility] Nino N[facility]era [facility]  (RN and PT)   Discharge Needs   Anticipated D/C needs To be determined     Pt discussed during nursing rounds. Dx sepsis, respiratory failure 2/2 pulmonary edema and possible PNA, on 15L O2 (no home O2). Home w/spouse, though pt has been staying w/daughter since he was recently discharge from [place] last week. Pt is independent w/walker at baseline. Pt is current w/[facility] for RN/PT, new F2F entered to include OT. PT/OT evals will be needed for dc recommendation, RN is aware. Pt has hx at [facility] for [address]. SW/CM will need to monitor O2 needs for possible home O2 at dc if [facility] is not the recommendation. Plan: TBD     / to remain available for support and/or discharge planning.      SHANNAN Aleman    [phone]

## 2023-12-19 NOTE — ED QUICK NOTES
Orders for admission, patient is aware of plan and ready to go upstairs. Any questions, please call ED RN Christopher Franco at extension [phone]. Patient Covid vaccination status: Fully vaccinated     COVID Test Ordered in ED: SARS-CoV-2/Flu A and B/RSV by PCR (GeneXpert)    COVID Suspicion at Admission: N/A    Running Infusions:      Mental Status/LOC at time of transport: A&Ox3-4    Other pertinent information: High fall risk.  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

## 2023-12-19 NOTE — PHYSICAL THERAPY NOTE
PT orders recd, chart reviewed. Pt is currently on 15LO2, O2sat 90%. Discussed with rn, will hold at this time. Will re attempt when appropriate.

## 2023-12-19 NOTE — ED INITIAL ASSESSMENT (HPI)
Pt presents with daughter for c/o increased weakness, AMS. Pt s/p kidney transplant, no longer on dialysis. Daughter denies recent cough or fever. Pt was recently admitted to [facility] s/p fall. Daughter reports pt is too weak to transfer and has been sleeping a lot.

## 2023-12-19 NOTE — OCCUPATIONAL THERAPY NOTE
12/19/23 1057   VISIT TYPE   OT Inpatient Visit Type Attempted Evaluation  RN reported pt presents with increased O2 needs. That pt was sat at 90 with 15 lit O2 flow. Hold on this date.    OT Follow Up   Charge Missed visit   Follow Up Needed  Hold on this date         Malvin Saenz OTR/L  [address]

## 2023-12-19 NOTE — RESPIRATORY THERAPY NOTE
Latest Reference Range & Units 12/19/23 02:29   ABG PH 7.35 - 7.45  7.41   ABG PCO2 35 - 45 mm Hg 40   ABG PO2 80 - 100 mm Hg 66 (L)   ABG HCO3 21.0 - 27.0 mEq/L 25.4   ABG O2 SATURATION 94.0 - 100.0 % 92.9 (L)   Blood Gas Base Excess -2.0 - 2.0 mmol/L 0.7   Oxygen Delivery Device  Hi Flow Nasal Canula   Oxygen Content 15.0 - 23.0 Vol% 12.3 (L)   L/M L/min 6.00   SAMPLE SITE  Right Radial   (L): Data is abnormally low

## 2023-12-19 NOTE — PLAN OF CARE
Received pt overnight. Discussed plan of care with Dr. Ny Valentino and overnight nocturnist, given 1 time dose of lasix due to increased BNP and need for supplemental oxygen. Restarting pt on home medications. Family made aware of plan of care, both son and daughter updated. Pt still remains lethargic but answers questions appropriately. Code status was discussed and DNAR was agreed upon with family. Problem: Patient Centered Care  Goal: Patient preferences are identified and integrated in the patient's plan of care  Description: Interventions:  - What would you like us to know as we care for you?  \"I want to feel better\"  - Provide timely, complete, and accurate information to patient/family  - Incorporate patient and family knowledge, values, beliefs, and cultural backgrounds into the planning and delivery of care  - Encourage patient/family to participate in care and decision-making at the level they choose  - Honor patient and family perspectives and choices  12/19/2023 0635 by Vanesa Becerra RN  Outcome: Progressing  12/19/2023 0634 by Vanesa Becerra RN  Outcome: Progressing     Problem: RESPIRATORY - ADULT  Goal: Achieves optimal ventilation and oxygenation  Description: INTERVENTIONS:  - Assess for changes in respiratory status  - Assess for changes in mentation and behavior  - Position to facilitate oxygenation and minimize respiratory effort  - Oxygen supplementation based on oxygen saturation or ABGs  - Provide Smoking Cessation handout, if applicable  - Encourage broncho-pulmonary hygiene including cough, deep breathe, Incentive Spirometry  - Assess the need for suctioning and perform as needed  - Assess and instruct to report SOB or any respiratory difficulty  - Respiratory Therapy support as indicated  - Manage/alleviate anxiety  - Monitor for signs/symptoms of CO2 retention  Outcome: Progressing     Problem: METABOLIC/FLUID AND ELECTROLYTES - ADULT  Goal: Glucose maintained within prescribed range  Description: INTERVENTIONS:  - Monitor Blood Glucose as ordered  - Assess for signs and symptoms of hyperglycemia and hypoglycemia  - Administer ordered medications to maintain glucose within target range  - Assess barriers to adequate nutritional intake and initiate nutrition consult as needed  - Instruct patient on self management of diabetes  Outcome: Progressing  Goal: Electrolytes maintained within normal limits  Description: INTERVENTIONS:  - Monitor labs and rhythm and assess patient for signs and symptoms of electrolyte imbalances  - Administer electrolyte replacement as ordered  - Monitor response to electrolyte replacements, including rhythm and repeat lab results as appropriate  - Fluid restriction as ordered  - Instruct patient on fluid and nutrition restrictions as appropriate  Outcome: Progressing  Goal: Hemodynamic stability and optimal renal function maintained  Description: INTERVENTIONS:  - Monitor labs and assess for signs and symptoms of volume excess or deficit  - Monitor intake, output and patient weight  - Monitor urine specific gravity, serum osmolarity and serum sodium as indicated or ordered  - Monitor response to interventions for patient's volume status, including labs, urine output, blood pressure (other measures as available)  - Encourage oral intake as appropriate  - Instruct patient on fluid and nutrition restrictions as appropriate  Outcome: Progressing     Problem: MUSCULOSKELETAL - ADULT  Goal: Return mobility to safest level of function  Description: INTERVENTIONS:  - Assess patient stability and activity tolerance for standing, transferring and ambulating w/ or w/o assistive devices  - Assist with transfers and ambulation using safe patient handling equipment as needed  - Ensure adequate protection for wounds/incisions during mobilization  - Obtain PT/OT consults as needed  - Advance activity as appropriate  - Communicate ordered activity level and limitations with patient/family  Outcome: Progressing  Goal: Maintain proper alignment of affected body part  Description: INTERVENTIONS:  - Support and protect limb and body alignment per provider's orders  - Instruct and reinforce with patient and family use of appropriate assistive device and precautions (e.g. spinal or hip dislocation precautions)  Outcome: Progressing     Problem: NEUROLOGICAL - ADULT  Goal: Achieves stable or improved neurological status  Description: INTERVENTIONS  - Assess for and report changes in neurological status  - Initiate measures to prevent increased intracranial pressure  - Maintain blood pressure and fluid volume within ordered parameters to optimize cerebral perfusion and minimize risk of hemorrhage  - Monitor temperature, glucose, and sodium.  Initiate appropriate interventions as ordered  Outcome: Progressing

## 2023-12-20 ENCOUNTER — APPOINTMENT (OUTPATIENT)
Dept: GENERAL RADIOLOGY | Facility: HOSPITAL | Age: 78
DRG: 871 | End: 2023-12-20
Attending: INTERNAL MEDICINE
Payer: MEDICARE

## 2023-12-20 ENCOUNTER — APPOINTMENT (OUTPATIENT)
Dept: GENERAL RADIOLOGY | Facility: HOSPITAL | Age: 78
End: 2023-12-20
Attending: INTERNAL MEDICINE
Payer: MEDICARE

## 2023-12-20 ENCOUNTER — APPOINTMENT (OUTPATIENT)
Dept: CT IMAGING | Facility: HOSPITAL | Age: 78
End: 2023-12-20
Attending: INTERNAL MEDICINE
Payer: MEDICARE

## 2023-12-20 ENCOUNTER — APPOINTMENT (OUTPATIENT)
Dept: CT IMAGING | Facility: HOSPITAL | Age: 78
DRG: 871 | End: 2023-12-20
Attending: INTERNAL MEDICINE
Payer: MEDICARE

## 2023-12-20 PROBLEM — Z94.0 RENAL TRANSPLANT RECIPIENT: Status: ACTIVE | Noted: 2023-12-20

## 2023-12-20 PROBLEM — D84.9 IMMUNOSUPPRESSED STATUS (HCC): Status: ACTIVE | Noted: 2023-12-20

## 2023-12-20 LAB
ALBUMIN SERPL-MCNC: 3.4 G/DL (ref 3.2–4.8)
ANION GAP SERPL CALC-SCNC: 14 MMOL/L (ref 0–18)
BASOPHILS # BLD AUTO: 0.03 X10(3) UL (ref 0–0.2)
BASOPHILS NFR BLD AUTO: 0.3 %
BUN BLD-MCNC: 59 MG/DL (ref 9–23)
BUN/CREAT SERPL: 18.1 (ref 10–20)
CALCIUM BLD-MCNC: 8.8 MG/DL (ref 8.7–10.4)
CHLORIDE SERPL-SCNC: 107 MMOL/L (ref 98–112)
CO2 SERPL-SCNC: 21 MMOL/L (ref 21–32)
CREAT BLD-MCNC: 3.26 MG/DL
DEPRECATED RDW RBC AUTO: 50.3 FL (ref 35.1–46.3)
EGFRCR SERPLBLD CKD-EPI 2021: 19 ML/MIN/1.73M2 (ref 60–?)
EOSINOPHIL # BLD AUTO: 0.01 X10(3) UL (ref 0–0.7)
EOSINOPHIL NFR BLD AUTO: 0.1 %
ERYTHROCYTE [DISTWIDTH] IN BLOOD BY AUTOMATED COUNT: 15.3 % (ref 11–15)
GLUCOSE BLD-MCNC: 108 MG/DL (ref 70–99)
GLUCOSE BLDC GLUCOMTR-MCNC: 131 MG/DL (ref 70–99)
GLUCOSE BLDC GLUCOMTR-MCNC: 136 MG/DL (ref 70–99)
GLUCOSE BLDC GLUCOMTR-MCNC: 137 MG/DL (ref 70–99)
GLUCOSE BLDC GLUCOMTR-MCNC: 235 MG/DL (ref 70–99)
HCT VFR BLD AUTO: 25.5 %
HGB BLD-MCNC: 8.1 G/DL
IMM GRANULOCYTES # BLD AUTO: 0.04 X10(3) UL (ref 0–1)
IMM GRANULOCYTES NFR BLD: 0.3 %
LYMPHOCYTES # BLD AUTO: 0.58 X10(3) UL (ref 1–4)
LYMPHOCYTES NFR BLD AUTO: 4.9 %
MCH RBC QN AUTO: 28.3 PG (ref 26–34)
MCHC RBC AUTO-ENTMCNC: 31.8 G/DL (ref 31–37)
MCV RBC AUTO: 89.2 FL
MONOCYTES # BLD AUTO: 0.63 X10(3) UL (ref 0.1–1)
MONOCYTES NFR BLD AUTO: 5.3 %
NEUTROPHILS # BLD AUTO: 10.6 X10 (3) UL (ref 1.5–7.7)
NEUTROPHILS # BLD AUTO: 10.6 X10(3) UL (ref 1.5–7.7)
NEUTROPHILS NFR BLD AUTO: 89.1 %
OSMOLALITY SERPL CALC.SUM OF ELEC: 311 MOSM/KG (ref 275–295)
PHOSPHATE SERPL-MCNC: 4.8 MG/DL (ref 2.4–5.1)
PLATELET # BLD AUTO: 153 10(3)UL (ref 150–450)
POTASSIUM SERPL-SCNC: 4.9 MMOL/L (ref 3.5–5.1)
RBC # BLD AUTO: 2.86 X10(6)UL
SODIUM SERPL-SCNC: 142 MMOL/L (ref 136–145)
TROPONIN I SERPL HS-MCNC: 71 NG/L
TROPONIN I SERPL HS-MCNC: 86 NG/L
TROPONIN I SERPL HS-MCNC: 86 NG/L
WBC # BLD AUTO: 11.9 X10(3) UL (ref 4–11)

## 2023-12-20 PROCEDURE — 71045 X-RAY EXAM CHEST 1 VIEW: CPT | Performed by: INTERNAL MEDICINE

## 2023-12-20 PROCEDURE — 99233 SBSQ HOSP IP/OBS HIGH 50: CPT | Performed by: INTERNAL MEDICINE

## 2023-12-20 PROCEDURE — 99233 SBSQ HOSP IP/OBS HIGH 50: CPT | Performed by: HOSPITALIST

## 2023-12-20 PROCEDURE — 99291 CRITICAL CARE FIRST HOUR: CPT | Performed by: INTERNAL MEDICINE

## 2023-12-20 PROCEDURE — 71250 CT THORAX DX C-: CPT | Performed by: INTERNAL MEDICINE

## 2023-12-20 PROCEDURE — 5A0945Z ASSISTANCE WITH RESPIRATORY VENTILATION, 24-96 CONSECUTIVE HOURS: ICD-10-PCS | Performed by: INTERNAL MEDICINE

## 2023-12-20 RX ORDER — AMIODARONE HYDROCHLORIDE 200 MG/1
400 TABLET ORAL 2 TIMES DAILY WITH MEALS
Status: DISCONTINUED | OUTPATIENT
Start: 2023-12-20 | End: 2023-12-22

## 2023-12-20 RX ORDER — METOPROLOL TARTRATE 1 MG/ML
5 INJECTION, SOLUTION INTRAVENOUS ONCE
Status: COMPLETED | OUTPATIENT
Start: 2023-12-20 | End: 2023-12-20

## 2023-12-20 RX ORDER — TACROLIMUS 0.5 MG/1
1 CAPSULE ORAL DAILY
Status: DISCONTINUED | OUTPATIENT
Start: 2023-12-21 | End: 2023-12-23

## 2023-12-20 RX ORDER — DILTIAZEM HYDROCHLORIDE 5 MG/ML
10 INJECTION INTRAVENOUS ONCE
Status: COMPLETED | OUTPATIENT
Start: 2023-12-20 | End: 2023-12-20

## 2023-12-20 RX ORDER — FUROSEMIDE 10 MG/ML
20 INJECTION INTRAMUSCULAR; INTRAVENOUS ONCE
Status: DISCONTINUED | OUTPATIENT
Start: 2023-12-20 | End: 2023-12-20

## 2023-12-20 RX ORDER — FUROSEMIDE 10 MG/ML
40 INJECTION INTRAMUSCULAR; INTRAVENOUS ONCE
Status: COMPLETED | OUTPATIENT
Start: 2023-12-20 | End: 2023-12-20

## 2023-12-20 RX ORDER — TACROLIMUS 0.5 MG/1
0.5 CAPSULE ORAL NIGHTLY
Status: DISCONTINUED | OUTPATIENT
Start: 2023-12-20 | End: 2023-12-23

## 2023-12-20 NOTE — PHYSICAL THERAPY NOTE
Second day/attempt to see pt for PT evaluation. Pt now on 40LO2 vapotherm. Pt remains inappropriate for therapy intervention. Will hold for one more day, if pt not appropriate, will dc current orders with plan for new orders when pt is appropriate.

## 2023-12-20 NOTE — PLAN OF CARE
Patient comfortable, only complaint of pain is behind left ear from vapotherm. Increased oxygen overnight, CT completed and additional IV lasix given. Weaning vapotherm as tolerated with respiratory therapist. IV cardizem continued, cardiology consulted and amiodarone PO started. Patient unable to work with PT/OT today due to oxygen requirements. Patient accuchek AC/HS. Family updated on plan of care.   Problem: Patient Centered Care  Goal: Patient preferences are identified and integrated in the patient's plan of care  Description: Interventions:  - What would you like us to know as we care for you?   - Provide timely, complete, and accurate information to patient/family  - Incorporate patient and family knowledge, values, beliefs, and cultural backgrounds into the planning and delivery of care  - Encourage patient/family to participate in care and decision-making at the level they choose  - Honor patient and family perspectives and choices  Outcome: Progressing     Problem: RESPIRATORY - ADULT  Goal: Achieves optimal ventilation and oxygenation  Description: INTERVENTIONS:  - Assess for changes in respiratory status  - Assess for changes in mentation and behavior  - Position to facilitate oxygenation and minimize respiratory effort  - Oxygen supplementation based on oxygen saturation or ABGs  - Provide Smoking Cessation handout, if applicable  - Encourage broncho-pulmonary hygiene including cough, deep breathe, Incentive Spirometry  - Assess the need for suctioning and perform as needed  - Assess and instruct to report SOB or any respiratory difficulty  - Respiratory Therapy support as indicated  - Manage/alleviate anxiety  - Monitor for signs/symptoms of CO2 retention  Outcome: Progressing     Problem: METABOLIC/FLUID AND ELECTROLYTES - ADULT  Goal: Glucose maintained within prescribed range  Description: INTERVENTIONS:  - Monitor Blood Glucose as ordered  - Assess for signs and symptoms of hyperglycemia and hypoglycemia  - Administer ordered medications to maintain glucose within target range  - Assess barriers to adequate nutritional intake and initiate nutrition consult as needed  - Instruct patient on self management of diabetes  Outcome: Progressing  Goal: Electrolytes maintained within normal limits  Description: INTERVENTIONS:  - Monitor labs and rhythm and assess patient for signs and symptoms of electrolyte imbalances  - Administer electrolyte replacement as ordered  - Monitor response to electrolyte replacements, including rhythm and repeat lab results as appropriate  - Fluid restriction as ordered  - Instruct patient on fluid and nutrition restrictions as appropriate  Outcome: Progressing  Goal: Hemodynamic stability and optimal renal function maintained  Description: INTERVENTIONS:  - Monitor labs and assess for signs and symptoms of volume excess or deficit  - Monitor intake, output and patient weight  - Monitor urine specific gravity, serum osmolarity and serum sodium as indicated or ordered  - Monitor response to interventions for patient's volume status, including labs, urine output, blood pressure (other measures as available)  - Encourage oral intake as appropriate  - Instruct patient on fluid and nutrition restrictions as appropriate  Outcome: Progressing     Problem: MUSCULOSKELETAL - ADULT  Goal: Return mobility to safest level of function  Description: INTERVENTIONS:  - Assess patient stability and activity tolerance for standing, transferring and ambulating w/ or w/o assistive devices  - Assist with transfers and ambulation using safe patient handling equipment as needed  - Ensure adequate protection for wounds/incisions during mobilization  - Obtain PT/OT consults as needed  - Advance activity as appropriate  - Communicate ordered activity level and limitations with patient/family  Outcome: Progressing  Goal: Maintain proper alignment of affected body part  Description: INTERVENTIONS:  - Support and protect limb and body alignment per provider's orders  - Instruct and reinforce with patient and family use of appropriate assistive device and precautions (e.g. spinal or hip dislocation precautions)  Outcome: Progressing     Problem: NEUROLOGICAL - ADULT  Goal: Achieves stable or improved neurological status  Description: INTERVENTIONS  - Assess for and report changes in neurological status  - Initiate measures to prevent increased intracranial pressure  - Maintain blood pressure and fluid volume within ordered parameters to optimize cerebral perfusion and minimize risk of hemorrhage  - Monitor temperature, glucose, and sodium.  Initiate appropriate interventions as ordered  Outcome: Progressing

## 2023-12-20 NOTE — PLAN OF CARE
Patient is lethargic but alert and oriented. Incresing O2 demands overnight from 15L HFNC to 40L Vapotherm 100%. Irregular heart rate jumping from 110's-140's. One time dose of metoprolol given per MD, see EMAR. EKG showing afib RVR. Cardizem bolus given and gtt initiated. Tylenol given for pain. Daughter at bedside, all questions answered. All safety measures maintained.      Problem: Patient Centered Care  Goal: Patient preferences are identified and integrated in the patient's plan of care  Description: Interventions:  - What would you like us to know as we care for you?   - Provide timely, complete, and accurate information to patient/family  - Incorporate patient and family knowledge, values, beliefs, and cultural backgrounds into the planning and delivery of care  - Encourage patient/family to participate in care and decision-making at the level they choose  - Honor patient and family perspectives and choices  Outcome: Progressing     Problem: RESPIRATORY - ADULT  Goal: Achieves optimal ventilation and oxygenation  Description: INTERVENTIONS:  - Assess for changes in respiratory status  - Assess for changes in mentation and behavior  - Position to facilitate oxygenation and minimize respiratory effort  - Oxygen supplementation based on oxygen saturation or ABGs  - Provide Smoking Cessation handout, if applicable  - Encourage broncho-pulmonary hygiene including cough, deep breathe, Incentive Spirometry  - Assess the need for suctioning and perform as needed  - Assess and instruct to report SOB or any respiratory difficulty  - Respiratory Therapy support as indicated  - Manage/alleviate anxiety  - Monitor for signs/symptoms of CO2 retention  Outcome: Progressing     Problem: METABOLIC/FLUID AND ELECTROLYTES - ADULT  Goal: Glucose maintained within prescribed range  Description: INTERVENTIONS:  - Monitor Blood Glucose as ordered  - Assess for signs and symptoms of hyperglycemia and hypoglycemia  - Administer ordered medications to maintain glucose within target range  - Assess barriers to adequate nutritional intake and initiate nutrition consult as needed  - Instruct patient on self management of diabetes  Outcome: Progressing  Goal: Electrolytes maintained within normal limits  Description: INTERVENTIONS:  - Monitor labs and rhythm and assess patient for signs and symptoms of electrolyte imbalances  - Administer electrolyte replacement as ordered  - Monitor response to electrolyte replacements, including rhythm and repeat lab results as appropriate  - Fluid restriction as ordered  - Instruct patient on fluid and nutrition restrictions as appropriate  Outcome: Progressing  Goal: Hemodynamic stability and optimal renal function maintained  Description: INTERVENTIONS:  - Monitor labs and assess for signs and symptoms of volume excess or deficit  - Monitor intake, output and patient weight  - Monitor urine specific gravity, serum osmolarity and serum sodium as indicated or ordered  - Monitor response to interventions for patient's volume status, including labs, urine output, blood pressure (other measures as available)  - Encourage oral intake as appropriate  - Instruct patient on fluid and nutrition restrictions as appropriate  Outcome: Progressing     Problem: MUSCULOSKELETAL - ADULT  Goal: Return mobility to safest level of function  Description: INTERVENTIONS:  - Assess patient stability and activity tolerance for standing, transferring and ambulating w/ or w/o assistive devices  - Assist with transfers and ambulation using safe patient handling equipment as needed  - Ensure adequate protection for wounds/incisions during mobilization  - Obtain PT/OT consults as needed  - Advance activity as appropriate  - Communicate ordered activity level and limitations with patient/family  Outcome: Progressing  Goal: Maintain proper alignment of affected body part  Description: INTERVENTIONS:  - Support and protect limb and body alignment per provider's orders  - Instruct and reinforce with patient and family use of appropriate assistive device and precautions (e.g. spinal or hip dislocation precautions)  Outcome: Progressing     Problem: NEUROLOGICAL - ADULT  Goal: Achieves stable or improved neurological status  Description: INTERVENTIONS  - Assess for and report changes in neurological status  - Initiate measures to prevent increased intracranial pressure  - Maintain blood pressure and fluid volume within ordered parameters to optimize cerebral perfusion and minimize risk of hemorrhage  - Monitor temperature, glucose, and sodium.  Initiate appropriate interventions as ordered  Outcome: Progressing

## 2023-12-20 NOTE — OCCUPATIONAL THERAPY NOTE
Attempted OT evaluation. Pt now requiring 40L FiO2 100% of vapotherm. Pt not appropriate for OT evaluation. Will re-attempt once more tomorrow prior to completing orders.       Bayron Akbar, [address]  Inpatient Rehabilitation  Occupational Therapy  [phone]

## 2023-12-20 NOTE — CONSULTS
[facility]    PATIENT'S NAME: Bigg Juan   ATTENDING PHYSICIAN: Asa Burt MD   CONSULTING PHYSICIAN: Precious Aleman. Judy Christianson MD   PATIENT ACCOUNT#:   [id number]    LOCATION:  [address] [place] [address] RECORD #:   [id number]       YOB: 1945  ADMISSION DATE:       12/18/2023      CONSULT DATE:  12/20/2023    REPORT OF CONSULTATION      HISTORY OF PRESENT ILLNESS:  The patient is a 81-year-old man who was admitted to [facility] 2 days ago with lethargy and altered mental status and has been found to have pneumonia and a urinary tract infection. He was recently admitted to both [facility] and [facility].  This morning, he went into atrial fibrillation with rapid ventricular response, and we were called to consult. I have spoken to the patient through the interpretation provided by his daughter and granddaughter. PAST MEDICAL HISTORY:  Renal failure, status post double renal transplant 2017, and coronary artery disease, status post triple bypass in 2017. Both of these were done at [facility]. Hypertension, diabetes, dyslipidemia. PAST SURGICAL HISTORY:  Includes the transplant and bypass as above as well as an appendectomy. MEDICATIONS:  His home medications include tacrolimus, aspirin, gabapentin, finasteride, insulin, nifedipine, rosuvastatin, torsemide, calcitriol, prednisone, metoprolol, omeprazole, and tamsulosin. ALLERGIES:  He has no known drug allergies. SOCIAL HISTORY:  He is a nonsmoker and a nondrinker. He is  with 4 children. REVIEW OF SYSTEMS:  Cannot be obtained. PHYSICAL EXAMINATION:    GENERAL:  Well-developed, well-nourished male, mild distress, looking ill, but awake and responsive. VITAL SIGNS:  Blood pressure is 115/69. Respirations 26, unlabored. Pulse 120, irregular. Temperature 98.1. Saturating at 92% on high-flow, high-humidity Vapotherm. HEENT:  Unremarkable. NECK:  There is increased jugular venous pressure.   Carotids are brisk without bruits. LUNGS:  Basilar crackles and crackles also in the right anterior lung space. HEART:  Heart tones reveal normal S1 and S2 though somewhat distant. There is a grade 2/6 early peaking systolic ejection murmur. ABDOMEN:  Soft and nontender. EXTREMITIES:  Warm and dry. There is mild edema. LABORATORY DATA:  Labs from today show sodium 142, potassium 4.9, BUN 59, creatinine 3.26, which is decreased from yesterday. BNP 1409. Troponin 100. Procalcitonin 0.31. WBC 11.9, hemoglobin 8.1, down from 9.4 last evening, platelets 905. Chest x-ray shows bilateral infiltrates possibly representing CHF or possibly pneumonia. EKG on admission shows sinus rhythm with a rate of 98. There are no acute changes. EKG from this morning shows atrial fibrillation/flutter with a rate of 115 and some nonspecific ST and T wave changes. ASSESSMENT:    1.   New-onset atrial fibrillation with rapid ventricular response. Rate better controlled on IV Cardizem. This is his first episode of atrial fibrillation, and there is no atrial fibrillation in his history either. Reluctant to start anticoagulation with dropping hemoglobin. Stable blood pressure. 2.   Acute sepsis. Pneumonia and/or urinary tract infection in an immunocompromised patient. 3.   Hypoxic respiratory failure. Possible component of CHF. 4.   CAD, status post remote CABG. No chest pain. Elevated troponin of uncertain etiology. Echo from last week done at [facility] shows normal LV size and contractility with mild aortic stenosis. PLAN:    1. With falling hemoglobin, will withhold full anticoagulation at this time. 2.   Will attempt to restore sinus rhythm with p.o. amiodarone. 3.   Continue IV Cardizem drip as needed for rate control. Continue p.o. metoprolol as well. 4.   Diuretics per renal.  5.   Trend troponins. Thank you for this consultation report. Dictated By Maty Bean.  Brittney Barry MD  d: 12/20/2023 09:35:31  t: 12/20/2023 09:56:04  [facility] [id number]  [facility] [place]/

## 2023-12-20 NOTE — RESPIRATORY THERAPY NOTE
Pt required [facility] do to SpO2 in the 80's.       12/20/23 0235   Oxygen Utilization   O2 Device High flow/High humidity   O2 Flow Rate (L/min) 40 L/min   FiO2 (%) 100 %   Humidity High   [place] Filled

## 2023-12-20 NOTE — CM/SW NOTE
Order received for POLST. POLST form started. Requires MD and PT signature. Dr. Enrico Vásquez notified via secure chat that POLST form is on hard chart and requires her signature. / to remain available for support and/or discharge planning.       Lonzie Sandhoff MBA BSN RN CRRN   RN Case Manager  [phone]

## 2023-12-20 NOTE — CONSULTS
Cardiology (consult dictated)    Assessment:  1. New onset of atrial fibrillation, with rapid ventricular response initially. Rate better controlled on IV Cardizem. 2.  Acute sepsis. Pneumonia versus UTI. Immunocompromised patient. Status post renal transplant. 3.  Hypoxic respiratory failure. 4.  CAD, status post CABG 2016. Recent echo from last week shows normal LV size and contractility, and mild aortic stenosis. Elevated troponin 2 days ago of uncertain significance. Plan:  1. With falling hemoglobin, will withhold full anticoagulation at this time. 2.  Will attempt to restore sinus rhythm with p.o. amiodarone. 3.  Repeat troponin. 4.  Continue Cardizem for rate control. Thank you.

## 2023-12-20 NOTE — CM/SW NOTE
PT/OT unable to evaluate today as Addison Rodriguez is now on 40L FIO2- vapotherm. Will continue to follow and support with dc planning needs. / to remain available for support and/or discharge planning.       Beau RUIZA BSN RN CRRN   RN Case Manager  [phone]

## 2023-12-21 LAB
ANION GAP SERPL CALC-SCNC: 14 MMOL/L (ref 0–18)
ATRIAL RATE: 312 BPM
BASOPHILS # BLD AUTO: 0.02 X10(3) UL (ref 0–0.2)
BASOPHILS NFR BLD AUTO: 0.2 %
BUN BLD-MCNC: 65 MG/DL (ref 9–23)
BUN/CREAT SERPL: 18.8 (ref 10–20)
CALCIUM BLD-MCNC: 8.8 MG/DL (ref 8.7–10.4)
CHLORIDE SERPL-SCNC: 104 MMOL/L (ref 98–112)
CO2 SERPL-SCNC: 22 MMOL/L (ref 21–32)
CREAT BLD-MCNC: 3.46 MG/DL
DEPRECATED RDW RBC AUTO: 48.2 FL (ref 35.1–46.3)
EGFRCR SERPLBLD CKD-EPI 2021: 17 ML/MIN/1.73M2 (ref 60–?)
EOSINOPHIL # BLD AUTO: 0.11 X10(3) UL (ref 0–0.7)
EOSINOPHIL NFR BLD AUTO: 1.1 %
ERYTHROCYTE [DISTWIDTH] IN BLOOD BY AUTOMATED COUNT: 15 % (ref 11–15)
GLUCOSE BLD-MCNC: 129 MG/DL (ref 70–99)
GLUCOSE BLDC GLUCOMTR-MCNC: 155 MG/DL (ref 70–99)
GLUCOSE BLDC GLUCOMTR-MCNC: 180 MG/DL (ref 70–99)
GLUCOSE BLDC GLUCOMTR-MCNC: 197 MG/DL (ref 70–99)
GLUCOSE BLDC GLUCOMTR-MCNC: 198 MG/DL (ref 70–99)
GLUCOSE BLDC GLUCOMTR-MCNC: 199 MG/DL (ref 70–99)
HCT VFR BLD AUTO: 24.8 %
HGB BLD-MCNC: 7.8 G/DL
IMM GRANULOCYTES # BLD AUTO: 0.02 X10(3) UL (ref 0–1)
IMM GRANULOCYTES NFR BLD: 0.2 %
LDH SERPL L TO P-CCNC: 234 U/L
LYMPHOCYTES # BLD AUTO: 0.65 X10(3) UL (ref 1–4)
LYMPHOCYTES NFR BLD AUTO: 6.8 %
MAGNESIUM SERPL-MCNC: 2.3 MG/DL (ref 1.6–2.6)
MCH RBC QN AUTO: 27.7 PG (ref 26–34)
MCHC RBC AUTO-ENTMCNC: 31.5 G/DL (ref 31–37)
MCV RBC AUTO: 87.9 FL
MONOCYTES # BLD AUTO: 0.51 X10(3) UL (ref 0.1–1)
MONOCYTES NFR BLD AUTO: 5.3 %
NEUTROPHILS # BLD AUTO: 8.27 X10 (3) UL (ref 1.5–7.7)
NEUTROPHILS # BLD AUTO: 8.27 X10(3) UL (ref 1.5–7.7)
NEUTROPHILS NFR BLD AUTO: 86.4 %
OSMOLALITY SERPL CALC.SUM OF ELEC: 310 MOSM/KG (ref 275–295)
P AXIS: -40 DEGREES
PLATELET # BLD AUTO: 190 10(3)UL (ref 150–450)
POTASSIUM SERPL-SCNC: 4.7 MMOL/L (ref 3.5–5.1)
Q-T INTERVAL: 322 MS
Q-T INTERVAL: 396 MS
QRS DURATION: 76 MS
QRS DURATION: 86 MS
QTC CALCULATION (BEZET): 433 MS
QTC CALCULATION (BEZET): 477 MS
R AXIS: 10 DEGREES
R AXIS: 5 DEGREES
RBC # BLD AUTO: 2.82 X10(6)UL
SODIUM SERPL-SCNC: 140 MMOL/L (ref 136–145)
T AXIS: 129 DEGREES
T AXIS: 131 DEGREES
VENTRICULAR RATE: 132 BPM
VENTRICULAR RATE: 72 BPM
WBC # BLD AUTO: 9.6 X10(3) UL (ref 4–11)

## 2023-12-21 PROCEDURE — 99291 CRITICAL CARE FIRST HOUR: CPT | Performed by: INTERNAL MEDICINE

## 2023-12-21 PROCEDURE — 99233 SBSQ HOSP IP/OBS HIGH 50: CPT | Performed by: INTERNAL MEDICINE

## 2023-12-21 RX ORDER — ATOVAQUONE 750 MG/5ML
750 SUSPENSION ORAL 2 TIMES DAILY WITH MEALS
Status: DISCONTINUED | OUTPATIENT
Start: 2023-12-21 | End: 2023-12-23

## 2023-12-21 NOTE — PHYSICAL THERAPY NOTE
PHYSICAL THERAPY EVALUATION - INPATIENT     Room Number: 204/204-A  Evaluation Date: 12/21/2023  Type of Evaluation: Initial   Physician Order: PT Eval and Treat    Presenting Problem: sepsis MINNIE  Reason for Therapy: Mobility Dysfunction and Discharge Planning    PHYSICAL THERAPY ASSESSMENT   Orders received and chart reviewed. RN approved participation in physical therapy. PPE worn by therapist: mask and gloves. Patient was wearing a mask during session. Patient is a 69year old male admitted 12/18/2023 for Presenting Problem: sepsis MINNIE. Patient presented in bed sleeping but agreeable to sit EOB. Pt on high oxygen 70% 30L and saturating. Pt requires assist with sitting balance. Pt is limited with endurance due to O2 sats depleting to 87% after sitting EOB x5 min. Pt was able to drink protein shake while sitting EOB. Education provided on Physical therapy plan of care and physiological benefits of out of bed mobility. Patient with fair carryover. Bed mobility: mod A    Patient was left in bed at end of session with all needs in reach. Patient's current functional deficits include ambulation. The patient's Approx Degree of Impairment: 57.7% has been calculated based on documentation in the [facility] '6 clicks' Inpatient Basic Mobility Short Form. Research supports that patients with this level of impairment may benefit from Home with no services. RN aware of patient status post session. Patient will benefit from continued IP PT services to address these deficits in preparation for discharge. DISCHARGE RECOMMENDATIONS  PT Discharge Recommendations: Sub-acute rehabilitation    PLAN  PT Treatment Plan: Bed mobility; Patient education; Family education;Gait training;Transfer training  Rehab Potential : Fair  Frequency (Obs): 3-5x/week       PHYSICAL THERAPY MEDICAL/SOCIAL HISTORY     History related to current admission:      Problem List  Principal Problem:    Sepsis due to undetermined organism [facility])  Active Problems:    Respiratory failure with hypoxia (HCC)    MINNIE (acute kidney injury) ([facility]    Altered mental status, unspecified altered mental status type    Secondary hyperparathyroidism ([facility]    Chronic kidney disease (CKD), stage IV (severe) ([facility]    Immunosuppressed status ([facility]    Renal transplant recipient      Past Medical History  Past Medical History:   Diagnosis Date    Chronic renal failure 09/19/2012    Hyperlipidemia 02/05/2007    Kidney replaced by transplant 02/17/2020    Renal disorder     Type II diabetes mellitus with nephropathy ([facility] 02/15/2016    Overview:  for more than 30 years    Type II or unspecified type diabetes mellitus without mention of complication, not stated as uncontrolled     Unspecified essential hypertension        Past Surgical History  Past Surgical History:   Procedure Laterality Date    OTHER SURGICAL HISTORY  10/11/12    Left Upper Arm AVF w/Cephalic Vein Transposition by Dr. Tonia Escobedo  Type of Home: House   Home Layout: One level              Lives With: Spouse  Drives: No     Patient Regularly Uses: None    Prior Level of [place]: ind    SUBJECTIVE  \"    PHYSICAL THERAPY EXAMINATION     OBJECTIVE  Precautions: Bed/chair alarm  Fall Risk: High fall risk    WEIGHT BEARING RESTRICTION  Weight Bearing Restriction: None                RANGE OF MOTION AND STRENGTH ASSESSMENT  Upper extremity ROM and strength are within functional limits     Lower extremity ROM is within functional limits     Lower extremity strength is within functional limits     BALANCE  Static Sitting: Fair -  Dynamic Sitting: Poor +  Static Standing: Not tested  Dynamic Standing: Not tested      O2 WALK  Oxygen Therapy  SPO2% on Oxygen at Rest: 92  At rest oxygen flow (liters per minute):  (70% FiO2, 30L vapotherm, desatst to 87% with activity)    AM-PAC '6-Clicks' INPATIENT SHORT FORM - BASIC MOBILITY  How much difficulty does the patient currently have. ..   Patient Difficulty: Turning over in bed (including adjusting bedclothes, sheets and blankets)?: A Little   Patient Difficulty: Sitting down on and standing up from a chair with arms (e.g., wheelchair, bedside commode, etc.): A Little   Patient Difficulty: Moving from lying on back to sitting on the side of the bed?: A Lot   How much help from another person does the patient currently need. .. Help from Another: Moving to and from a bed to a chair (including a wheelchair)?: A Little   Help from Another: Need to walk in hospital room?: A Lot   Help from Another: Climbing 3-5 steps with a railing?: A Lot     AM-PAC Score:  Raw Score: 15   Approx Degree of Impairment: 57.7%   Standardized Score (AM-PAC Scale): 39.45   CMS Modifier (G-Code): CK      Exercise/Education Provided:  Bed mobility  Gait training  Transfer training    Patient End of Session: Up in chair    CURRENT GOALS    Goals to be met by: 12/28  Patient Goal Patient's self-stated goal is: home   Goal #1 Patient is able to demonstrate supine - sit EOB @ level: supervision     Goal #1   Current Status    Goal #2 Patient is able to demonstrate transfers Sit to/from Stand at assistance level: supervision with walker - rolling     Goal #2  Current Status    Goal #3 Patient is able to ambulate 50 feet with assist device: walker - rolling at assistance level: supervision   Goal #3   Current Status    Goal #4 Patient will negotiate 3 stairs/one curb w/ assistive device and supervision   Goal #4   Current Status    Goal #5 Patient to demonstrate independence with home activity/exercise instructions provided to patient in preparation for discharge.    Goal #5   Current Status    Goal #6    Goal #6  Current Status      Patient Evaluation Complexity Level:  History Low - no personal factors and/or co-morbidities   Examination of body systems Low - addressing 1-2 elements   Clinical Presentation Low - Stable   Clinical Decision Making Low Complexity       Theractivity: 8

## 2023-12-21 NOTE — PLAN OF CARE
Patient AO x4. Vapotherm. Cardizem gtt infusing. No c/o pain overnight. Bed locked in lowest position Call light within reach. All needs met at this time.      Problem: Patient Centered Care  Goal: Patient preferences are identified and integrated in the patient's plan of care  Description: Interventions:  - What would you like us to know as we care for you?   - Provide timely, complete, and accurate information to patient/family  - Incorporate patient and family knowledge, values, beliefs, and cultural backgrounds into the planning and delivery of care  - Encourage patient/family to participate in care and decision-making at the level they choose  - Honor patient and family perspectives and choices  Outcome: Progressing     Problem: RESPIRATORY - ADULT  Goal: Achieves optimal ventilation and oxygenation  Description: INTERVENTIONS:  - Assess for changes in respiratory status  - Assess for changes in mentation and behavior  - Position to facilitate oxygenation and minimize respiratory effort  - Oxygen supplementation based on oxygen saturation or ABGs  - Provide Smoking Cessation handout, if applicable  - Encourage broncho-pulmonary hygiene including cough, deep breathe, Incentive Spirometry  - Assess the need for suctioning and perform as needed  - Assess and instruct to report SOB or any respiratory difficulty  - Respiratory Therapy support as indicated  - Manage/alleviate anxiety  - Monitor for signs/symptoms of CO2 retention  Outcome: Progressing     Problem: METABOLIC/FLUID AND ELECTROLYTES - ADULT  Goal: Glucose maintained within prescribed range  Description: INTERVENTIONS:  - Monitor Blood Glucose as ordered  - Assess for signs and symptoms of hyperglycemia and hypoglycemia  - Administer ordered medications to maintain glucose within target range  - Assess barriers to adequate nutritional intake and initiate nutrition consult as needed  - Instruct patient on self management of diabetes  Outcome: Progressing  Goal: Electrolytes maintained within normal limits  Description: INTERVENTIONS:  - Monitor labs and rhythm and assess patient for signs and symptoms of electrolyte imbalances  - Administer electrolyte replacement as ordered  - Monitor response to electrolyte replacements, including rhythm and repeat lab results as appropriate  - Fluid restriction as ordered  - Instruct patient on fluid and nutrition restrictions as appropriate  Outcome: Progressing  Goal: Hemodynamic stability and optimal renal function maintained  Description: INTERVENTIONS:  - Monitor labs and assess for signs and symptoms of volume excess or deficit  - Monitor intake, output and patient weight  - Monitor urine specific gravity, serum osmolarity and serum sodium as indicated or ordered  - Monitor response to interventions for patient's volume status, including labs, urine output, blood pressure (other measures as available)  - Encourage oral intake as appropriate  - Instruct patient on fluid and nutrition restrictions as appropriate  Outcome: Progressing     Problem: MUSCULOSKELETAL - ADULT  Goal: Return mobility to safest level of function  Description: INTERVENTIONS:  - Assess patient stability and activity tolerance for standing, transferring and ambulating w/ or w/o assistive devices  - Assist with transfers and ambulation using safe patient handling equipment as needed  - Ensure adequate protection for wounds/incisions during mobilization  - Obtain PT/OT consults as needed  - Advance activity as appropriate  - Communicate ordered activity level and limitations with patient/family  Outcome: Progressing  Goal: Maintain proper alignment of affected body part  Description: INTERVENTIONS:  - Support and protect limb and body alignment per provider's orders  - Instruct and reinforce with patient and family use of appropriate assistive device and precautions (e.g. spinal or hip dislocation precautions)  Outcome: Progressing     Problem: NEUROLOGICAL - ADULT  Goal: Achieves stable or improved neurological status  Description: INTERVENTIONS  - Assess for and report changes in neurological status  - Initiate measures to prevent increased intracranial pressure  - Maintain blood pressure and fluid volume within ordered parameters to optimize cerebral perfusion and minimize risk of hemorrhage  - Monitor temperature, glucose, and sodium.  Initiate appropriate interventions as ordered  Outcome: Progressing

## 2023-12-21 NOTE — CM/SW NOTE
CM spoke with daughter who was present during therapy today. Family understands need for [facility] and is agreeable. Patient has been to [facility] and daughter would like this location. The plan is to return to the daughter's house in [place] after DC from [place] [id number]. CM to initiate [facility] referrals and complete PASRR,    Covering CM will follow-up with daughter on choice  Will not require insurance approval prior to transition. PLAN:  [facility] at [place], will need list for choice, Insurance Kya Robison is not required. / to remain available for support and/or discharge planning.       Sen Wellington MBA BSN RN CRRN   RN Case Manager  [phone]

## 2023-12-21 NOTE — PLAN OF CARE
Problem: RESPIRATORY - ADULT  Goal: Achieves optimal ventilation and oxygenation  Description: INTERVENTIONS:  - Assess for changes in respiratory status  - Assess for changes in mentation and behavior  - Position to facilitate oxygenation and minimize respiratory effort  - Oxygen supplementation based on oxygen saturation or ABGs  - Provide Smoking Cessation handout, if applicable  - Encourage broncho-pulmonary hygiene including cough, deep breathe, Incentive Spirometry  - Assess the need for suctioning and perform as needed  - Assess and instruct to report SOB or any respiratory difficulty  - Respiratory Therapy support as indicated  - Manage/alleviate anxiety  - Monitor for signs/symptoms of CO2 retention  Outcome: Progressing     Pt received on Vapotherm, saturating appropriately, FiO2 wean down to 85%. RT will continue to monitor.        12/20/23 2330   Oxygen Utilization   $ RT Standby Charge (per 15 min) 1  (Vapo check)   O2 Device High flow/High humidity  (Vapotherm)   O2 Flow Rate (L/min) 40 L/min   FiO2 (%) 85 %   SpO2 92 %   Humidity High   Icard 1/2 Full      12/20/23 2330   Additional Assessments   Pulse 106   Resp (!) 27   SpO2 92 %

## 2023-12-22 ENCOUNTER — APPOINTMENT (OUTPATIENT)
Dept: GENERAL RADIOLOGY | Facility: HOSPITAL | Age: 78
DRG: 871 | End: 2023-12-22
Attending: INTERNAL MEDICINE
Payer: MEDICARE

## 2023-12-22 ENCOUNTER — APPOINTMENT (OUTPATIENT)
Dept: GENERAL RADIOLOGY | Facility: HOSPITAL | Age: 78
End: 2023-12-22
Attending: INTERNAL MEDICINE
Payer: MEDICARE

## 2023-12-22 LAB
ALBUMIN SERPL-MCNC: 3.6 G/DL (ref 3.2–4.8)
ANION GAP SERPL CALC-SCNC: 10 MMOL/L (ref 0–18)
BUN BLD-MCNC: 76 MG/DL (ref 9–23)
BUN/CREAT SERPL: 19.7 (ref 10–20)
C DIFF TOX B STL QL: NEGATIVE
CALCIUM BLD-MCNC: 8.7 MG/DL (ref 8.7–10.4)
CHLORIDE SERPL-SCNC: 107 MMOL/L (ref 98–112)
CO2 SERPL-SCNC: 20 MMOL/L (ref 21–32)
CREAT BLD-MCNC: 3.85 MG/DL
EGFRCR SERPLBLD CKD-EPI 2021: 15 ML/MIN/1.73M2 (ref 60–?)
GLUCOSE BLD-MCNC: 158 MG/DL (ref 70–99)
GLUCOSE BLDC GLUCOMTR-MCNC: 162 MG/DL (ref 70–99)
GLUCOSE BLDC GLUCOMTR-MCNC: 186 MG/DL (ref 70–99)
GLUCOSE BLDC GLUCOMTR-MCNC: 214 MG/DL (ref 70–99)
GLUCOSE BLDC GLUCOMTR-MCNC: 228 MG/DL (ref 70–99)
OSMOLALITY SERPL CALC.SUM OF ELEC: 310 MOSM/KG (ref 275–295)
PHOSPHATE SERPL-MCNC: 5.4 MG/DL (ref 2.4–5.1)
POTASSIUM SERPL-SCNC: 4.6 MMOL/L (ref 3.5–5.1)
SODIUM SERPL-SCNC: 137 MMOL/L (ref 136–145)

## 2023-12-22 PROCEDURE — 99233 SBSQ HOSP IP/OBS HIGH 50: CPT | Performed by: INTERNAL MEDICINE

## 2023-12-22 PROCEDURE — 71045 X-RAY EXAM CHEST 1 VIEW: CPT | Performed by: INTERNAL MEDICINE

## 2023-12-22 PROCEDURE — 99291 CRITICAL CARE FIRST HOUR: CPT | Performed by: INTERNAL MEDICINE

## 2023-12-22 RX ORDER — AMIODARONE HYDROCHLORIDE 200 MG/1
400 TABLET ORAL DAILY
Status: DISCONTINUED | OUTPATIENT
Start: 2023-12-23 | End: 2023-12-23

## 2023-12-22 RX ORDER — ATOVAQUONE 750 MG/5ML
750 SUSPENSION ORAL 2 TIMES DAILY WITH MEALS
Qty: 30 EACH | Refills: 0 | Status: SHIPPED | OUTPATIENT
Start: 2023-12-23

## 2023-12-22 RX ORDER — AMIODARONE HYDROCHLORIDE 400 MG/1
400 TABLET ORAL DAILY
Qty: 30 TABLET | Refills: 0 | Status: SHIPPED | OUTPATIENT
Start: 2023-12-23

## 2023-12-22 NOTE — CM/SW NOTE
[facility] Transfer Note:  Reason for transfer:   Continuity of care  Request initiated by:  Provider: DR Alfredo Braxton    Active Acute Medical issue:  1. Bilateral lung infiltrates  2. Likely multifocal pneumonia  3. Acute hypoxemic respiratory failure  4. Acute kidney injury  5. Elevated troponin  prior history of renal transplantation at [facility]    Anticipated Transfer Plan:   [facility] called, plan of care discussed with transfer center RN.   CD's ordered of imaging will be tubed to 122    4pm  No bed at [facility] at this time    Vasile Garcia, [address] [facility]  [phone]

## 2023-12-22 NOTE — PLAN OF CARE
Remains on Vapotherm 30L @ 60% with saturation ranging from 88-93%. Tolerating well and more alert this AM. Bath done. Family @ bedside overnight.      Problem: RESPIRATORY - ADULT  Goal: Achieves optimal ventilation and oxygenation  Description: INTERVENTIONS:  - Assess for changes in respiratory status  - Assess for changes in mentation and behavior  - Position to facilitate oxygenation and minimize respiratory effort  - Oxygen supplementation based on oxygen saturation or ABGs  - Provide Smoking Cessation handout, if applicable  - Encourage broncho-pulmonary hygiene including cough, deep breathe, Incentive Spirometry  - Assess the need for suctioning and perform as needed  - Assess and instruct to report SOB or any respiratory difficulty  - Respiratory Therapy support as indicated  - Manage/alleviate anxiety  - Monitor for signs/symptoms of CO2 retention  Outcome: Progressing     Problem: METABOLIC/FLUID AND ELECTROLYTES - ADULT  Goal: Glucose maintained within prescribed range  Description: INTERVENTIONS:  - Monitor Blood Glucose as ordered  - Assess for signs and symptoms of hyperglycemia and hypoglycemia  - Administer ordered medications to maintain glucose within target range  - Assess barriers to adequate nutritional intake and initiate nutrition consult as needed  - Instruct patient on self management of diabetes  Outcome: Progressing  Goal: Electrolytes maintained within normal limits  Description: INTERVENTIONS:  - Monitor labs and rhythm and assess patient for signs and symptoms of electrolyte imbalances  - Administer electrolyte replacement as ordered  - Monitor response to electrolyte replacements, including rhythm and repeat lab results as appropriate  - Fluid restriction as ordered  - Instruct patient on fluid and nutrition restrictions as appropriate  Outcome: Progressing  Goal: Hemodynamic stability and optimal renal function maintained  Description: INTERVENTIONS:  - Monitor labs and assess for signs and symptoms of volume excess or deficit  - Monitor intake, output and patient weight  - Monitor urine specific gravity, serum osmolarity and serum sodium as indicated or ordered  - Monitor response to interventions for patient's volume status, including labs, urine output, blood pressure (other measures as available)  - Encourage oral intake as appropriate  - Instruct patient on fluid and nutrition restrictions as appropriate  Outcome: Progressing     Problem: MUSCULOSKELETAL - ADULT  Goal: Maintain proper alignment of affected body part  Description: INTERVENTIONS:  - Support and protect limb and body alignment per provider's orders  - Instruct and reinforce with patient and family use of appropriate assistive device and precautions (e.g. spinal or hip dislocation precautions)  Outcome: Progressing     Problem: NEUROLOGICAL - ADULT  Goal: Achieves stable or improved neurological status  Description: INTERVENTIONS  - Assess for and report changes in neurological status  - Initiate measures to prevent increased intracranial pressure  - Maintain blood pressure and fluid volume within ordered parameters to optimize cerebral perfusion and minimize risk of hemorrhage  - Monitor temperature, glucose, and sodium.  Initiate appropriate interventions as ordered  Outcome: Progressing

## 2023-12-22 NOTE — PLAN OF CARE
Patient A/Ox4. On 30L oxygen. Vital signs as charted. Off cardizem gtt. Active bowel sounds. C-diff culture sent. Primofit intact. Pain management, see MAR. Call light within reach. Bed in low and locked position. Hourly rounds complete.    Problem: METABOLIC/FLUID AND ELECTROLYTES - ADULT  Goal: Electrolytes maintained within normal limits  Description: INTERVENTIONS:  - Monitor labs and rhythm and assess patient for signs and symptoms of electrolyte imbalances  - Administer electrolyte replacement as ordered  - Monitor response to electrolyte replacements, including rhythm and repeat lab results as appropriate  - Fluid restriction as ordered  - Instruct patient on fluid and nutrition restrictions as appropriate  Outcome: Progressing     Problem: RESPIRATORY - ADULT  Goal: Achieves optimal ventilation and oxygenation  Description: INTERVENTIONS:  - Assess for changes in respiratory status  - Assess for changes in mentation and behavior  - Position to facilitate oxygenation and minimize respiratory effort  - Oxygen supplementation based on oxygen saturation or ABGs  - Provide Smoking Cessation handout, if applicable  - Encourage broncho-pulmonary hygiene including cough, deep breathe, Incentive Spirometry  - Assess the need for suctioning and perform as needed  - Assess and instruct to report SOB or any respiratory difficulty  - Respiratory Therapy support as indicated  - Manage/alleviate anxiety  - Monitor for signs/symptoms of CO2 retention  Outcome: Not Progressing     Problem: METABOLIC/FLUID AND ELECTROLYTES - ADULT  Goal: Glucose maintained within prescribed range  Description: INTERVENTIONS:  - Monitor Blood Glucose as ordered  - Assess for signs and symptoms of hyperglycemia and hypoglycemia  - Administer ordered medications to maintain glucose within target range  - Assess barriers to adequate nutritional intake and initiate nutrition consult as needed  - Instruct patient on self management of diabetes  Outcome: Not Progressing  Goal: Hemodynamic stability and optimal renal function maintained  Description: INTERVENTIONS:  - Monitor labs and assess for signs and symptoms of volume excess or deficit  - Monitor intake, output and patient weight  - Monitor urine specific gravity, serum osmolarity and serum sodium as indicated or ordered  - Monitor response to interventions for patient's volume status, including labs, urine output, blood pressure (other measures as available)  - Encourage oral intake as appropriate  - Instruct patient on fluid and nutrition restrictions as appropriate  Outcome: Not Progressing

## 2023-12-22 NOTE — RESPIRATORY THERAPY NOTE
Pt received last night at 1900 on Vapotherm 30L, 70% FiO2 tolerating well. FiO2 weaned to 60%, tolerated well, O2 sats remain in the low 90s, VSS. RT will continue to monitor.

## 2023-12-23 VITALS
HEART RATE: 94 BPM | BODY MASS INDEX: 24.78 KG/M2 | SYSTOLIC BLOOD PRESSURE: 126 MMHG | TEMPERATURE: 98 F | HEIGHT: 67 IN | OXYGEN SATURATION: 91 % | WEIGHT: 157.88 LBS | RESPIRATION RATE: 24 BRPM | DIASTOLIC BLOOD PRESSURE: 53 MMHG

## 2023-12-23 PROCEDURE — 99239 HOSP IP/OBS DSCHRG MGMT >30: CPT | Performed by: INTERNAL MEDICINE

## 2023-12-23 NOTE — PLAN OF CARE
Pt weaned off of vapotherm to 8L HFNC. Appetite improved, ate some of breakfast and dinner, family brought in taco for pt for lunch. Had multiple loose bowel movements today. Plan to transfer to [facility] when ICU bed available.      Problem: RESPIRATORY - ADULT  Goal: Achieves optimal ventilation and oxygenation  Description: INTERVENTIONS:  - Assess for changes in respiratory status  - Assess for changes in mentation and behavior  - Position to facilitate oxygenation and minimize respiratory effort  - Oxygen supplementation based on oxygen saturation or ABGs  - Provide Smoking Cessation handout, if applicable  - Encourage broncho-pulmonary hygiene including cough, deep breathe, Incentive Spirometry  - Assess the need for suctioning and perform as needed  - Assess and instruct to report SOB or any respiratory difficulty  - Respiratory Therapy support as indicated  - Manage/alleviate anxiety  - Monitor for signs/symptoms of CO2 retention  Outcome: Progressing     Problem: METABOLIC/FLUID AND ELECTROLYTES - ADULT  Goal: Glucose maintained within prescribed range  Description: INTERVENTIONS:  - Monitor Blood Glucose as ordered  - Assess for signs and symptoms of hyperglycemia and hypoglycemia  - Administer ordered medications to maintain glucose within target range  - Assess barriers to adequate nutritional intake and initiate nutrition consult as needed  - Instruct patient on self management of diabetes  Outcome: Progressing  Goal: Electrolytes maintained within normal limits  Description: INTERVENTIONS:  - Monitor labs and rhythm and assess patient for signs and symptoms of electrolyte imbalances  - Administer electrolyte replacement as ordered  - Monitor response to electrolyte replacements, including rhythm and repeat lab results as appropriate  - Fluid restriction as ordered  - Instruct patient on fluid and nutrition restrictions as appropriate  Outcome: Progressing     Problem: MUSCULOSKELETAL - ADULT  Goal: Return mobility to safest level of function  Description: INTERVENTIONS:  - Assess patient stability and activity tolerance for standing, transferring and ambulating w/ or w/o assistive devices  - Assist with transfers and ambulation using safe patient handling equipment as needed  - Ensure adequate protection for wounds/incisions during mobilization  - Obtain PT/OT consults as needed  - Advance activity as appropriate  - Communicate ordered activity level and limitations with patient/family  Outcome: Not Progressing

## 2023-12-23 NOTE — PROGRESS NOTES
Called by RN about patient having been accepted to another facility for higher level of care. Requesting discharge orders. Chart briefly reviewed. Discharge orders placed. Discharge summary Per attending.

## 2023-12-23 NOTE — PROGRESS NOTES
Report called to Camelia Hooker at [facility] ([phone]). Full report given, last times on IV ABX before transfer also reported. Complete packet including 3 CD's with all scans/procedures done here post admission. Family member present at bedside, verified with [facility] transport that family member can ride to [facility] [place] with patient.

## 2023-12-23 NOTE — CM/SW NOTE
2323:  Received call from ALISHA CHAMPION [facility] ICU RN that her co-worker received a call from [facility] that they have a bed available for patient tonight. Per Eileen Mcclain RN patient going to RM# [id number] at [facility] to RN# [phone], and Winston arranged transport for patient with [facility] - [facility] ETA to [address] [place] [address] 1.5 - 2 HRS. ERCM confirmed with [facility] RN she has CD's that include all Radiology imaging that has been performed since pt's 12/18/23 admission - Eileen Mcclain RN has all radiology exams on CD's except for [facility] performed this AM. ERCM informed Eileen Mcclain RN this ERCM will print pt's 12/22/23 PCXR onto CD and bring up to her. ERCM also informed [facility] RN this ERCM will complete PCS in epic and bring completed PCS with 2 face sheets up to her. [facility] RN informed to please make copy of pt's chart for patient's transfer and ensure copy of chart and Radiology CD's go with patient upon transfer and PCS with 2 face sheets are given to [facility] upon their arrival to [address] [place] [address] ICU. Manny Mak ICU RN v/u on all of the above. 8437:  PCS completed. Completed PCS and 2 face sheets printed. Radiology CD with 12/22/23 retrieved from POD 4 printer - all of the above delivered to [place] [id number] for pt's transfer to [facility].

## 2023-12-24 NOTE — DISCHARGE SUMMARY
[facility]    Discharge Summary    [facility] Patient Status:  Inpatient    1945 MRN [id number]   Location [facility] 2W/SW Attending No att. providers found   Hosp Day # 4 PCP Susan Cordoba [facility]     Date of Admission: 2023 Disposition: [address] [place] [address]     Date of Discharge: 23    Admitting Diagnosis: MINNIE (acute kidney injury) ([facility] [place] [address] [N17.9]  Sepsis due to undetermined organism ([facility] [address] [A41.9]  Altered mental status, unspecified altered mental status type 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 TCM Follow-Up Recommendation:  LACE > 58:  High Risk of readmission after discharge from the hospital.      Problem List:   Patient Active Problem List   Diagnosis    Diabetic polyneuropathy (HCC)    Nocturnal muscle cramps    Chronic renal failure    Dermatophytosis of nail    Vitamin D deficiency    Benign prostatic hyperplasia without lower urinary tract symptoms    Calculus of kidney    Diabetes mellitus (HCC)    Dyslipidemia    Esophagitis    Essential hypertension    Hyperlipidemia    Hypomagnesemia    Hypophosphatemia    Immunocompromised patient ([address]    Kidney replaced by transplant    Latent tuberculosis    Neuropathy    PAD (peripheral artery disease) ([facility] [place] [address]    Secondary hyperparathyroidism of renal origin ([facility] [place] [address]    Iron deficiency anemia    Atherosclerosis of coronary artery    Acute respiratory distress syndrome (ARDS) due to COVID-19 virus [facility])    Diabetic neuropathy associated with type 1 diabetes mellitus (HCC)    Chronic renal disease, stage IV (HCC)    Viral gastroenteritis    Hyperkalemia    Respiratory failure with hypoxia (HCC)    Sepsis due to undetermined organism ([facility] [address]    MINNIE (acute kidney injury) ([facility] [address]    Altered mental status, unspecified altered mental status type    Secondary hyperparathyroidism ([facility] [address]    Chronic kidney disease (CKD), stage IV (severe) ([facility] [address]    Immunosuppressed status ([facility] [address]    Renal transplant recipient       Reason for Admission: Altered mental status    History of Present Illness: \"David Audra Schilder is a(n) 69year old male, with a past medical history significant for hypertension, diabetes, ESRD status post renal transplant presents to the ER with decreasing alertness, fever and chills. Patient at this time extremely lethargic, unable to stay awake long enough to give appropriate history. He is accompanied by his daughter who currently is at bedside claims he was recently discharged after being admitted post fall with worsening renal function, along with pneumonia and a UTI. He was apparently discharged 5 days ago had been doing relatively well till earlier today when he seemed much more lethargic sleeping most of the time having a difficult time answering questions appeared to have chills and felt feverish. Daughter claims he was not discharged on any antibiotics however his renal function had improved to [facility] Course: 79M with h/o kidney transplant 2017, CAD s/p CABG 2015, HTN, HL, DM2, BPH, GERD who was recently hospitalized after a fall with MINNIE. First admitted to [facility] on 12/9, then transfer to [facility] from 12/12 - 12/14. Acute sepsis due to UTI vs bacterial PNA  Immunocompromised patient  Leukocytosis  -ID consulted and changed abx to zosyn, doxy, atovaquone. -pulm on consult  -procal elevated . 31  -f/u bcx.  NGDT  -ucx negative but per daughter was given some abx at [facility]. U/A on 12/10 showed pyuria and +LE  -f/u urine strep, legionella ags - neg     Acute hypoxic respiratory failure   -due to sepsis, possible pneumonia, pulmonary edema  -O2 - 40L, 60% fio2 vapotherm currently  -pulm on consult     Acute pulmonary edema  -2/2 MINNIE, acute diastolic CHF  -lasix IV per nephrology, 40 mg once  -monitor wt, I/O     MINNIE on CKD stage IV  S/p DDKT 8907  Metaolic acidosis  -nephrology on consult  -was followed by transplant team at [facility] on recent hospitalization  -Cr on day of discharge 12/14 was 3.46, today 3.85  -lasix IV per nephrology  -hold nephrotoxic meds  -cont tacrolimus, prednisone     Hyperkalemia in past, today 4.6  -lokelma  -hold arb     Acute metabolic encephalopathy  -2/2 sepsis. Better. monitor     CAD  S/p CABG 2015  NSTEMI type II with elevated troponin  -recent echo 12/12 normal EF 60-65%, G2DD     Afib RVR  - seen by cards  - started IV cardizem - converted to NSR, CDZ stopped and converted to amiodarone PO  - cont po metoprolol  - hold full anticoagulation for now given drop in hgb     HTN  -cont meds and monitor BP     HL  -cont crestor     DM2 with associated nephropathy and neuropathy  -monitor BG  -cover ISS  -cont levemir     BPH  -cont flomax, finasterid     GERD  -cont protonix       Consultations: Infectious disease, cardiology, pulmonary, nephrology    Procedures: None    Complications: None    Discharge Condition: Good    Discharge Medications:      Discharge Medications        START taking these medications        Instructions Prescription details   Amiodarone HCl 400 MG Tabs  Commonly known as: PACERONE      Take 1 tablet (400 mg total) by mouth daily. Quantity: 30 tablet  Refills: 0     atovaquone 750 MG/5ML Susp  Commonly known as: Mepron      Take 5 mL (750 mg total) by mouth 2 (two) times daily with meals.    Quantity: 30 each  Refills: 0     sodium chloride 0.9% SOLN 100 mL with doxycycline hyclate 100 MG SOLR 100 mg      Inject 100 mg into the vein every 12 (twelve) hours. Quantity: 1 ampule  Refills: 0            CONTINUE taking these medications        Instructions Prescription details   aspirin 81 MG Chew      Chew by mouth daily. Refills: 0     calcitriol 0.25 MCG Caps  Commonly known as: Rocaltrol      TAKE 1 CAPSULE BY MOUTH ONCE DAILY   Refills: 0     ergocalciferol 1.25 MG (35398 UT) Caps  Commonly known as: ERGOCALCIFEROL      Take by mouth once a week. Refills: 0     finasteride 5 MG Tabs  Commonly known as: Proscar      Take 1 tablet by mouth once daily   Quantity: 90 tablet  Refills: 0     insulin glargine 100 UNIT/ML Soln  Commonly known as: Lantus      Inject 25 Units into the skin nightly. Refills: 0     Insulin Pen Needle 32G X 4 MM Misc      Use 4x daily with insulin pens   Refills: 0     metoprolol tartrate 50 MG Tabs  Commonly known as: Lopressor      Take 1 tablet (50 mg total) by mouth 2 (two) times daily. Refills: 0     Multiple Vitamin Tabs      Take 1 tablet by mouth daily. Refills: 0     NIFEdipine ER 30 MG Tb24  Commonly known as: Procardia-XL      Take 1 tablet (30 mg total) by mouth daily. Refills: 0     NovoLOG FlexPen 100 UNIT/ML Sopn  Generic drug: insulin aspart      8 Units 3 (three) times daily before meals. Refills: 0     omeprazole 20 MG Cpdr  Commonly known as: PriLOSEC      Take 1 capsule (20 mg total) by mouth every morning before breakfast.   Refills: 0     OneTouch Verio Strp      CHECK BLOOD GLUCOSE 6 TIMES DAILY   Refills: 0     predniSONE 5 MG Tabs  Commonly known as: Deltasone      TK 1 T PO  QD   Refills: 0     rosuvastatin 5 MG Tabs  Commonly known as: Crestor      Take 1 tablet (5 mg total) by mouth once daily. Refills: 11     tacrolimus 1 MG Caps  Commonly known as: Prograf      Take 1 capsule (1 mg total) by mouth daily. Refills: 0     tacrolimus 0.5 MG Caps  Commonly known as: Prograf      1 capsule (0.5 mg total) at bedtime.    Refills: 2     tamsulosin 0.4 MG Caps  Commonly known as: Flomax      1 capsule (0.4 mg total)  in the morning and 1 capsule (0.4 mg total) before bedtime. 2 tabs 0900  1 tab 2100.    Refills: 0            STOP taking these medications      diphenoxylate-atropine 2.5-0.025 MG Tabs  Commonly known as: Lomotil        gabapentin 100 MG Caps  Commonly known as: Neurontin        torsemide 20 MG Tabs  Commonly known as: Demadex                  Where to Get Your Medications        Please  your prescriptions at the location directed by your doctor or nurse    Bring a paper prescription for each of these medications  Amiodarone HCl 400 MG Tabs  atovaquone 750 MG/5ML Susp  sodium chloride 0.9% SOLN 100 mL with doxycycline hyclate 100 MG SOLR 100 mg         Follow up Visits transferred to [facility] under the transplant care team    Follow up Labs: None none    Other Discharge Instructions: None    Wanda Naidu MD  12/23/2023  6:12 PM    > 35 min

## (undated) NOTE — ED AVS SNAPSHOT
Yara Aj   MRN: [id number]    Department:  [facility] Emergency Department   Date of Visit:  12/5/2019           Disclosure     Insurance plans vary and the physician(s) referred by the ER may not be covered by your plan.  Please contact your within the next three months to obtain basic health screening including reassessment of your blood pressure.     IF THERE IS ANY CHANGE OR WORSENING OF YOUR CONDITION, CALL YOUR PRIMARY CARE PHYSICIAN AT ONCE OR RETURN IMMEDIATELY TO THE EMERGENCY DEPARTMEN